# Patient Record
Sex: FEMALE | Race: WHITE | NOT HISPANIC OR LATINO | URBAN - METROPOLITAN AREA
[De-identification: names, ages, dates, MRNs, and addresses within clinical notes are randomized per-mention and may not be internally consistent; named-entity substitution may affect disease eponyms.]

---

## 2018-03-08 ENCOUNTER — INPATIENT (INPATIENT)
Facility: HOSPITAL | Age: 83
LOS: 10 days | Discharge: HOME | DRG: 125 | End: 2018-03-19
Attending: HOSPITALIST | Admitting: HOSPITALIST
Payer: MEDICARE

## 2018-03-08 VITALS
DIASTOLIC BLOOD PRESSURE: 59 MMHG | HEART RATE: 79 BPM | OXYGEN SATURATION: 95 % | RESPIRATION RATE: 18 BRPM | TEMPERATURE: 98 F | SYSTOLIC BLOOD PRESSURE: 113 MMHG

## 2018-03-08 DIAGNOSIS — Z96.653 PRESENCE OF ARTIFICIAL KNEE JOINT, BILATERAL: Chronic | ICD-10-CM

## 2018-03-08 DIAGNOSIS — E78.5 HYPERLIPIDEMIA, UNSPECIFIED: ICD-10-CM

## 2018-03-08 DIAGNOSIS — N30.00 ACUTE CYSTITIS WITHOUT HEMATURIA: ICD-10-CM
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
## 2018-03-08 DIAGNOSIS — E11.9 TYPE 2 DIABETES MELLITUS WITHOUT COMPLICATIONS: ICD-10-CM

## 2018-03-08 DIAGNOSIS — I25.10 ATHEROSCLEROTIC HEART DISEASE OF NATIVE CORONARY ARTERY WITHOUT ANGINA PECTORIS: ICD-10-CM

## 2018-03-08 DIAGNOSIS — S09.90XA UNSPECIFIED INJURY OF HEAD, INITIAL ENCOUNTER: ICD-10-CM

## 2018-03-08 DIAGNOSIS — Z96.649 PRESENCE OF UNSPECIFIED ARTIFICIAL HIP JOINT: Chronic | ICD-10-CM

## 2018-03-08 DIAGNOSIS — Z29.9 ENCOUNTER FOR PROPHYLACTIC MEASURES, UNSPECIFIED: ICD-10-CM

## 2018-03-08 DIAGNOSIS — I10 ESSENTIAL (PRIMARY) HYPERTENSION: ICD-10-CM

## 2018-03-08 DIAGNOSIS — Z79.01 LONG TERM (CURRENT) USE OF ANTICOAGULANTS: ICD-10-CM

## 2018-03-08 DIAGNOSIS — R26.81 UNSTEADINESS ON FEET: ICD-10-CM

## 2018-03-08 DIAGNOSIS — I48.91 UNSPECIFIED ATRIAL FIBRILLATION: ICD-10-CM

## 2018-03-08 DIAGNOSIS — G89.29 OTHER CHRONIC PAIN: ICD-10-CM

## 2018-03-08 LAB
ALBUMIN SERPL ELPH-MCNC: 3.3 G/DL — SIGNIFICANT CHANGE UP (ref 3.3–5.2)
ALP SERPL-CCNC: 46 U/L — SIGNIFICANT CHANGE UP (ref 40–120)
ALT FLD-CCNC: 13 U/L — SIGNIFICANT CHANGE UP
ANION GAP SERPL CALC-SCNC: 13 MMOL/L — SIGNIFICANT CHANGE UP (ref 5–17)
APPEARANCE UR: CLEAR — SIGNIFICANT CHANGE UP
AST SERPL-CCNC: 20 U/L — SIGNIFICANT CHANGE UP
BACTERIA # UR AUTO: ABNORMAL
BASOPHILS # BLD AUTO: 0 K/UL — SIGNIFICANT CHANGE UP (ref 0–0.2)
BILIRUB SERPL-MCNC: 0.6 MG/DL — SIGNIFICANT CHANGE UP (ref 0.4–2)
BILIRUB UR-MCNC: NEGATIVE — SIGNIFICANT CHANGE UP
BLD GP AB SCN SERPL QL: SIGNIFICANT CHANGE UP
BUN SERPL-MCNC: 34 MG/DL — HIGH (ref 8–20)
CALCIUM SERPL-MCNC: 9.1 MG/DL — SIGNIFICANT CHANGE UP (ref 8.6–10.2)
CHLORIDE SERPL-SCNC: 105 MMOL/L — SIGNIFICANT CHANGE UP (ref 98–107)
CO2 SERPL-SCNC: 24 MMOL/L — SIGNIFICANT CHANGE UP (ref 22–29)
COLOR SPEC: YELLOW — SIGNIFICANT CHANGE UP
CREAT SERPL-MCNC: 0.79 MG/DL — SIGNIFICANT CHANGE UP (ref 0.5–1.3)
DIFF PNL FLD: ABNORMAL
EOSINOPHIL # BLD AUTO: 0 K/UL — SIGNIFICANT CHANGE UP (ref 0–0.5)
EOSINOPHIL NFR BLD AUTO: 1 % — SIGNIFICANT CHANGE UP (ref 0–5)
EPI CELLS # UR: SIGNIFICANT CHANGE UP
GLUCOSE BLDC GLUCOMTR-MCNC: 159 MG/DL — HIGH (ref 70–99)
GLUCOSE SERPL-MCNC: 171 MG/DL — HIGH (ref 70–115)
GLUCOSE UR QL: NEGATIVE MG/DL — SIGNIFICANT CHANGE UP
HCT VFR BLD CALC: 36.3 % — LOW (ref 37–47)
HGB BLD-MCNC: 11.5 G/DL — LOW (ref 12–16)
INR BLD: 1.66 RATIO — HIGH (ref 0.88–1.16)
KETONES UR-MCNC: NEGATIVE — SIGNIFICANT CHANGE UP
LEUKOCYTE ESTERASE UR-ACNC: ABNORMAL
LYMPHOCYTES # BLD AUTO: 0.4 K/UL — LOW (ref 1–4.8)
LYMPHOCYTES # BLD AUTO: 7 % — LOW (ref 20–55)
MCHC RBC-ENTMCNC: 27.8 PG — SIGNIFICANT CHANGE UP (ref 27–31)
MCHC RBC-ENTMCNC: 31.7 G/DL — LOW (ref 32–36)
MCV RBC AUTO: 87.9 FL — SIGNIFICANT CHANGE UP (ref 81–99)
MONOCYTES # BLD AUTO: 0.5 K/UL — SIGNIFICANT CHANGE UP (ref 0–0.8)
MONOCYTES NFR BLD AUTO: 8 % — SIGNIFICANT CHANGE UP (ref 3–10)
NEUTROPHILS # BLD AUTO: 4.4 K/UL — SIGNIFICANT CHANGE UP (ref 1.8–8)
NEUTROPHILS NFR BLD AUTO: 81 % — HIGH (ref 37–73)
NEUTS BAND # BLD: 2 % — SIGNIFICANT CHANGE UP (ref 0–8)
NITRITE UR-MCNC: NEGATIVE — SIGNIFICANT CHANGE UP
PH UR: 6 — SIGNIFICANT CHANGE UP (ref 5–8)
PLAT MORPH BLD: ABNORMAL
PLATELET # BLD AUTO: 147 K/UL — LOW (ref 150–400)
POTASSIUM SERPL-MCNC: 4 MMOL/L — SIGNIFICANT CHANGE UP (ref 3.5–5.3)
POTASSIUM SERPL-SCNC: 4 MMOL/L — SIGNIFICANT CHANGE UP (ref 3.5–5.3)
PROT SERPL-MCNC: 6.1 G/DL — LOW (ref 6.6–8.7)
PROT UR-MCNC: 30 MG/DL
PROTHROM AB SERPL-ACNC: 18.4 SEC — HIGH (ref 9.8–12.7)
RBC # BLD: 4.13 M/UL — LOW (ref 4.4–5.2)
RBC # FLD: 15 % — SIGNIFICANT CHANGE UP (ref 11–15.6)
RBC BLD AUTO: NORMAL — SIGNIFICANT CHANGE UP
RBC CASTS # UR COMP ASSIST: SIGNIFICANT CHANGE UP /HPF (ref 0–4)
SODIUM SERPL-SCNC: 142 MMOL/L — SIGNIFICANT CHANGE UP (ref 135–145)
SP GR SPEC: 1.01 — SIGNIFICANT CHANGE UP (ref 1.01–1.02)
UROBILINOGEN FLD QL: 1 MG/DL
VARIANT LYMPHS # BLD: 1 % — SIGNIFICANT CHANGE UP (ref 0–6)
WBC # BLD: 5.3 K/UL — SIGNIFICANT CHANGE UP (ref 4.8–10.8)
WBC # FLD AUTO: 5.3 K/UL — SIGNIFICANT CHANGE UP (ref 4.8–10.8)
WBC UR QL: ABNORMAL

## 2018-03-08 PROCEDURE — 72125 CT NECK SPINE W/O DYE: CPT | Mod: 26

## 2018-03-08 PROCEDURE — 73560 X-RAY EXAM OF KNEE 1 OR 2: CPT | Mod: 26,RT

## 2018-03-08 PROCEDURE — 73502 X-RAY EXAM HIP UNI 2-3 VIEWS: CPT | Mod: 26,RT

## 2018-03-08 PROCEDURE — 93010 ELECTROCARDIOGRAM REPORT: CPT

## 2018-03-08 PROCEDURE — 99222 1ST HOSP IP/OBS MODERATE 55: CPT

## 2018-03-08 PROCEDURE — 71045 X-RAY EXAM CHEST 1 VIEW: CPT | Mod: 26

## 2018-03-08 PROCEDURE — 99285 EMERGENCY DEPT VISIT HI MDM: CPT

## 2018-03-08 PROCEDURE — 70450 CT HEAD/BRAIN W/O DYE: CPT | Mod: 26

## 2018-03-08 RX ORDER — GLUCAGON INJECTION, SOLUTION 0.5 MG/.1ML
1 INJECTION, SOLUTION SUBCUTANEOUS ONCE
Qty: 0 | Refills: 0 | Status: DISCONTINUED | OUTPATIENT
Start: 2018-03-08 | End: 2018-03-19

## 2018-03-08 RX ORDER — FAMOTIDINE 10 MG/ML
20 INJECTION INTRAVENOUS DAILY
Qty: 0 | Refills: 0 | Status: DISCONTINUED | OUTPATIENT
Start: 2018-03-08 | End: 2018-03-19

## 2018-03-08 RX ORDER — LISINOPRIL 2.5 MG/1
10 TABLET ORAL DAILY
Qty: 0 | Refills: 0 | Status: DISCONTINUED | OUTPATIENT
Start: 2018-03-08 | End: 2018-03-13

## 2018-03-08 RX ORDER — FENOFIBRATE,MICRONIZED 130 MG
145 CAPSULE ORAL DAILY
Qty: 0 | Refills: 0 | Status: DISCONTINUED | OUTPATIENT
Start: 2018-03-08 | End: 2018-03-13

## 2018-03-08 RX ORDER — CYCLOBENZAPRINE HYDROCHLORIDE 10 MG/1
10 TABLET, FILM COATED ORAL AT BEDTIME
Qty: 0 | Refills: 0 | Status: DISCONTINUED | OUTPATIENT
Start: 2018-03-08 | End: 2018-03-19

## 2018-03-08 RX ORDER — SACCHAROMYCES BOULARDII 250 MG
250 POWDER IN PACKET (EA) ORAL
Qty: 0 | Refills: 0 | Status: DISCONTINUED | OUTPATIENT
Start: 2018-03-08 | End: 2018-03-19

## 2018-03-08 RX ORDER — OXYBUTYNIN CHLORIDE 5 MG
5 TABLET ORAL
Qty: 0 | Refills: 0 | Status: DISCONTINUED | OUTPATIENT
Start: 2018-03-08 | End: 2018-03-19

## 2018-03-08 RX ORDER — INSULIN LISPRO 100/ML
VIAL (ML) SUBCUTANEOUS
Qty: 0 | Refills: 0 | Status: DISCONTINUED | OUTPATIENT
Start: 2018-03-08 | End: 2018-03-19

## 2018-03-08 RX ORDER — METOPROLOL TARTRATE 50 MG
0.5 TABLET ORAL
Qty: 0 | Refills: 0 | COMMUNITY

## 2018-03-08 RX ORDER — ACETAMINOPHEN 500 MG
975 TABLET ORAL ONCE
Qty: 0 | Refills: 0 | Status: COMPLETED | OUTPATIENT
Start: 2018-03-08 | End: 2018-03-08

## 2018-03-08 RX ORDER — ATORVASTATIN CALCIUM 80 MG/1
80 TABLET, FILM COATED ORAL AT BEDTIME
Qty: 0 | Refills: 0 | Status: DISCONTINUED | OUTPATIENT
Start: 2018-03-08 | End: 2018-03-19

## 2018-03-08 RX ORDER — SODIUM CHLORIDE 9 MG/ML
1000 INJECTION, SOLUTION INTRAVENOUS
Qty: 0 | Refills: 0 | Status: DISCONTINUED | OUTPATIENT
Start: 2018-03-08 | End: 2018-03-19

## 2018-03-08 RX ORDER — DEXTROSE 50 % IN WATER 50 %
1 SYRINGE (ML) INTRAVENOUS ONCE
Qty: 0 | Refills: 0 | Status: DISCONTINUED | OUTPATIENT
Start: 2018-03-08 | End: 2018-03-19

## 2018-03-08 RX ORDER — FONDAPARINUX SODIUM 2.5 MG/.5ML
1 INJECTION, SOLUTION SUBCUTANEOUS
Qty: 0 | Refills: 0 | COMMUNITY

## 2018-03-08 RX ORDER — INSULIN LISPRO 100/ML
VIAL (ML) SUBCUTANEOUS AT BEDTIME
Qty: 0 | Refills: 0 | Status: DISCONTINUED | OUTPATIENT
Start: 2018-03-08 | End: 2018-03-19

## 2018-03-08 RX ORDER — POTASSIUM CHLORIDE 20 MEQ
20 PACKET (EA) ORAL DAILY
Qty: 0 | Refills: 0 | Status: DISCONTINUED | OUTPATIENT
Start: 2018-03-08 | End: 2018-03-13

## 2018-03-08 RX ORDER — ROSUVASTATIN CALCIUM 5 MG/1
1 TABLET ORAL
Qty: 0 | Refills: 0 | COMMUNITY

## 2018-03-08 RX ORDER — MORPHINE SULFATE 50 MG/1
15 CAPSULE, EXTENDED RELEASE ORAL EVERY 12 HOURS
Qty: 0 | Refills: 0 | Status: DISCONTINUED | OUTPATIENT
Start: 2018-03-08 | End: 2018-03-15

## 2018-03-08 RX ORDER — ASPIRIN/CALCIUM CARB/MAGNESIUM 324 MG
81 TABLET ORAL DAILY
Qty: 0 | Refills: 0 | Status: DISCONTINUED | OUTPATIENT
Start: 2018-03-08 | End: 2018-03-19

## 2018-03-08 RX ORDER — BUMETANIDE 0.25 MG/ML
0.5 INJECTION INTRAMUSCULAR; INTRAVENOUS EVERY OTHER DAY
Qty: 0 | Refills: 0 | Status: DISCONTINUED | OUTPATIENT
Start: 2018-03-08 | End: 2018-03-13

## 2018-03-08 RX ORDER — DEXTROSE 50 % IN WATER 50 %
25 SYRINGE (ML) INTRAVENOUS ONCE
Qty: 0 | Refills: 0 | Status: DISCONTINUED | OUTPATIENT
Start: 2018-03-08 | End: 2018-03-19

## 2018-03-08 RX ORDER — DEXTROSE 50 % IN WATER 50 %
12.5 SYRINGE (ML) INTRAVENOUS ONCE
Qty: 0 | Refills: 0 | Status: DISCONTINUED | OUTPATIENT
Start: 2018-03-08 | End: 2018-03-19

## 2018-03-08 RX ORDER — METOPROLOL TARTRATE 50 MG
12.5 TABLET ORAL
Qty: 0 | Refills: 0 | Status: DISCONTINUED | OUTPATIENT
Start: 2018-03-08 | End: 2018-03-19

## 2018-03-08 RX ORDER — CEFTRIAXONE 500 MG/1
1 INJECTION, POWDER, FOR SOLUTION INTRAMUSCULAR; INTRAVENOUS EVERY 24 HOURS
Qty: 0 | Refills: 0 | Status: DISCONTINUED | OUTPATIENT
Start: 2018-03-08 | End: 2018-03-12

## 2018-03-08 RX ORDER — OXYBUTYNIN CHLORIDE 5 MG
1 TABLET ORAL
Qty: 0 | Refills: 0 | COMMUNITY

## 2018-03-08 RX ORDER — RIVAROXABAN 15 MG-20MG
15 KIT ORAL EVERY 24 HOURS
Qty: 0 | Refills: 0 | Status: DISCONTINUED | OUTPATIENT
Start: 2018-03-08 | End: 2018-03-19

## 2018-03-08 RX ADMIN — Medication 1: at 18:55

## 2018-03-08 RX ADMIN — Medication 12.5 MILLIGRAM(S): at 18:58

## 2018-03-08 RX ADMIN — Medication 5 MILLIGRAM(S): at 18:58

## 2018-03-08 RX ADMIN — Medication 975 MILLIGRAM(S): at 11:03

## 2018-03-08 NOTE — ED ADULT TRIAGE NOTE - CHIEF COMPLAINT QUOTE
Pt BIBA A&Ox3 c/o trip and fall, unwitnessed. Pt states " my legs gave out." Pt with + facial trauma, on xarelto. Denies LOC. Code trauma B activated.

## 2018-03-08 NOTE — ED PROVIDER NOTE - CARE PLAN
Principal Discharge DX:	Closed head injury, initial encounter  Secondary Diagnosis:	Unsteady gait  Secondary Diagnosis:	Fall from standing, initial encounter

## 2018-03-08 NOTE — ED PROVIDER NOTE - OBJECTIVE STATEMENT
This is an 82F w/Hx of HTN, Afib on rivaroxaban, obesity, hx of bilateral knee replacements who is BIBEMS for fall with head trauma. Pt was in her USOH at home when she got out of bed this morning and felt a sudden pain in her R-knee causing her leg to give out and her to fall forward striking her head. She denies neck pain or LOC, visual changes, fevers or chills. She has some pain over her R-cheekbone and R-mouth. She denies loose teeth. She denies back pain beyond her chronic R-flank pain, sensory changes or weakness. Pt was unable to stand up afterwards. Urinated on herself while waiting for help.

## 2018-03-08 NOTE — PROVIDER CONTACT NOTE (OTHER) - ASSESSMENT
Pt with no c/o pain before, during or after RX.  Pt will benefit from PT to maximize functional independence.  Will continue to follow.  Pt left Supine in bed in chair in no apparent distress and call bell within reach. Nurse aware.

## 2018-03-08 NOTE — ED ADULT NURSE REASSESSMENT NOTE - NS ED NURSE REASSESS COMMENT FT1
Patient resting comfortably in bed, VSS, ate lunch, waiting for bed status, will continue to monitor

## 2018-03-08 NOTE — PHYSICAL THERAPY INITIAL EVALUATION ADULT - ADDITIONAL COMMENTS
Pt lives alone in a 1 story house with 2 steps to enter.  PTA, amb with RW and Modified Independent with all ADLs and self care.

## 2018-03-08 NOTE — ED PROVIDER NOTE - PHYSICAL EXAMINATION
1' survey: airway intact, bilateral breath sounds, BP appropriate. HR appropriate, 2+ distal pulses in all extremities, exposed, GCS 15, pupils 3mm equal round and reactive  2' survey  GEN: uncomfortable, nontoxic, AOx4  HEAD: no scalp or palpable skull fx, +facial trauma  EYES: 3mm, pupils equal round and reactive to light, extra-occular movements are intact, full visual fields, swelling and tenderness over R-zygoma with echymosis  ENT: mucus membranes are moist, oropharynx is within normal limits, no malocclusion, no loose teeth, dried blood in tongue and on mouth and 1cm superficial laceration at commissure of R lips on mucosal surface (nonsuturable), no other visualized intraoral trauma. nose nontender, no epistaxis or dried blood, no septal hemtaoma, no discharge or bleeding from ears  RESP: lungs clear to auscultation bilaterally, equal breath sounds bilaterally, chest wall nontender anteriorly  GI: abd soft, nontender, nondistended, obese, mild R-flank tenderness  MSK: pelvis stable, tender to lateral compression over illiac wings and trochanters on the R-side, no C, T or L spine tenderness, deformity or stepoff, lateral R-back/flank tenderness mild without overlying visualized trauma (chronic per patient and at baseline), no upper extremity abnormalities, LLE is unontender iwthout deformity, scar c/w L-knee replacement, RLE notable for hip and knee arthroplasty scars, pain on ranging R-knee but full ROM and no palpable deformity, diffuse mild tenderness over joint, extensor intact, no byron tenderness over hip joint but TTP over greater trochanter, no other femur tenderness. neurovascularly intact below knee, 2+

## 2018-03-08 NOTE — CONSULT NOTE ADULT - SUBJECTIVE AND OBJECTIVE BOX
PMD - [facility]    82 year old female with PMH HTN, DMT2, Dyslipidemia, CAD s/p PCI, Chronic pain presents to [facility] after falling at home.   As per patient she got up out of bed this morning and her legs just gave way and she fell forward. She hasn't been feeling that good lately and vomited x 4 5 days ago, NBNB.  She denies any dizziness, paresthesiae, paresis, chest pain, palpitations, dyspnea, cough, nausea, abdominal pain, dysuria or diarrhea.  She uses a walker to get around.    PMH as above  PSH Bilateral TKA  Allergy - NKA  FH - reviewed and is non contributory.  SocHx Lives alone. No smoking, EtOH or drug use.  ROS- negative except as mentioned above.    O/E   Vital Signs Last 24 Hrs  T(C): 36.9 (08 Mar 2018 15:35), Max: 36.9 (08 Mar 2018 15:35)  T(F): 98.5 (08 Mar 2018 15:35), Max: 98.5 (08 Mar 2018 15:35)  HR: 80 (08 Mar 2018 18:59) (79 - 80)  BP: 130/58 (08 Mar 2018 18:59) (113/59 - 130/58)   RR: 18 (08 Mar 2018 15:35) (18 - 18)  SpO2: 95% (08 Mar 2018 15:35) (95% - 95%)      GEN: Morbidly obese  female appearing stated age, smelling of urine, NAD  HEENT: Right periorbital ecchymoses, [facility]    82 year old female with PMH HTN, DMT2, Dyslipidemia, CAD s/p PCI, Chronic pain presents to [facility] after falling at home.   As per patient she got up out of bed this morning and her legs just gave way and she fell forward. She hasn't been feeling that good lately and vomited x 4 5 days ago, NBNB.  She denies any dizziness, paresthesiae, paresis, chest pain, palpitations, dyspnea, cough, nausea, abdominal pain, dysuria or diarrhea.  She uses a walker to get around.    PMH as above  PSH Bilateral TKA  Allergy - NKA  FH - reviewed and is non contributory.  SocHx Lives alone. No smoking, EtOH or drug use.  ROS- negative except as mentioned above.    O/E   Vital Signs Last 24 Hrs  T(C): 36.9 (08 Mar 2018 15:35), Max: 36.9 (08 Mar 2018 15:35)  T(F): 98.5 (08 Mar 2018 15:35), Max: 98.5 (08 Mar 2018 15:35)  HR: 80 (08 Mar 2018 18:59) (79 - 80)  BP: 130/58 (08 Mar 2018 18:59) (113/59 - 130/58)   RR: 18 (08 Mar 2018 15:35) (18 - 18)  SpO2: 95% (08 Mar 2018 15:35) (95% - 95%)      GEN: Morbidly obese  female appearing stated age, smelling of urine, NAD  HEENT: Right periorbital ecchymoses, no nasal or ear discharge  Neck: Supple, thick  CVS: regular rate and rhythm S1, S2  Resp: CTAB  Breast: symmetric  GI: Soft nondistended nontender BS+  : No suprapubic tenderness  MSK: FROM, no edema  Integ: Skin warm and dry  CNS- AAOx3, grossly intact  Psych- Fair mood, affect    CBC Full  -  ( 08 Mar 2018 09:09 )  WBC Count : 5.3 K/uL  Hemoglobin : 11.5 g/dL  Hematocrit : 36.3 %  Platelet Count - Automated : 147 K/uL  Mean Cell Volume : 87.9 fl  Mean Cell Hemoglobin : 27.8 pg  Mean Cell Hemoglobin Concentration : 31.7 g/dL  Auto Neutrophil # : 4.4 K/uL  Auto Lymphocyte # : 0.4 K/uL  Auto Monocyte # : 0.5 K/uL  Auto Eosinophil # : 0.0 K/uL  Auto Basophil # : 0.0 K/uL  Auto Neutrophil % : 81.0 %  Auto Lymphocyte % : 7.0 %  Auto Monocyte % : 8.0 %  Auto Eosinophil % : 1.0 %  Auto Basophil % : x      PT/INR - ( 08 Mar 2018 10:08 )   PT: 18.4 sec;   INR: 1.66 ratio                 142  |  105  |  34.0<H>  ----------------------------<  171<H>  4.0   |  24.0  |  0.79    Ca    9.1      08 Mar 2018 09:09    TPro  6.1<L>  /  Alb  3.3  /  TBili  0.6  /  DBili  x   /  AST  20  /  ALT  13  /  AlkPhos  46          Urinalysis Basic - ( 08 Mar 2018 17:28 )    Color: Yellow / Appearance: Clear / S.010 / pH: x  Gluc: x / Ketone: Negative  / Bili: Negative / Urobili: 1 mg/dL   Blood: x / Protein: 30 mg/dL / Nitrite: Negative   Leuk Esterase: Small / RBC: 0-2 /HPF / WBC 11-25   Sq Epi: x / Non Sq Epi: Few / Bacteria: Few    < from: CT Head No Cont (18 @ 09:30) >   EXAM:  CT BRAIN                          PROCEDURE DATE:  2018          INTERPRETATION:      CLINICAL INFORMATION: 82-year-old trauma    TECHNIQUE: Contiguous non contrast axial images of brain from skull base   to vertex sagittal and coronal reformations.   This scan was performed   using automatic exposure control (radiation dose reduction software) to   obtain a diagnostic image quality scan with patient dose as low as   reasonably achievable.      COMPARISON: No previous examinationsare available for review.     FINDINGS:       There is prominence of the ventricles and cortical sulci. Midline   structures are intact. There are patchy hypodensities in periventricular   distribution consistent with chronic small vessel ischemic changes. There   is no CT evidence of acute territorial infarction.  There is no   hemorrhage, contusion or extraaxial collection. There is no acute   hydrocephalus. The visualized posterior fossa structures are intact.   There are scattered intracranial arterial calcification.  The visualized   paranasal sinuses are clear. There is right-sided periorbital   swelling/hematoma    IMPRESSION:       No parenchymal contusion, hemorrhage or extra-axial collection.    No CT evidence of an acute territorial infarction.    Chronic small vessel ischemic changes.    Right periorbital/soft tissue hematoma.                XU ESPINOZA M.D., ATTENDING RADIOLOGIST  This document has been electronically signed. Mar  8 2018  9:59AM    < end of copied text >      < from: Xray Chest 1 View AP/PA. (18 @ 09:13) >   EXAM:  XR CHEST AP OR PA 1V                          PROCEDURE DATE:  2018          INTERPRETATION:  Exam Date: 3/8/2018 9:13 AM    History: Trauma    Technique: Single frontal portable view of the chest with comparison to    2006    Findings:    Heart is enlarged however may be secondary to AP projection. Studies   limited by poor aspiration. Slight increased markings at the lung bases   likely secondary to poor inspiration. No focal consolidation.. The apices   and hemidiaphragms are unremarkable. Degenerative changes of the   visualized osseous structures.        Impression:    No acute disease       MARTINE LARSON M.D., ATTENDING RADIOLOGIST  This document has been electronically signed. Mar  8 2018  9:34AM    < end of copied text >      < from: CT Cervical Spine No Cont (18 @ 09:30) >   EXAM:  CT CERVICAL SPINE                          PROCEDURE DATE:  2018          INTERPRETATION:      REASON FOR EXAM:  Multitrauma     TECHNIQUE:   High resolution transaxial imaging with sagittal and coronal   reformations.    COMPARISON: None.    FINDINGS:  There is straightening of the normal cervical lordosis.  There is   osteopenia and multilevel degenerative spondylosis.     There are degenerative changes of the atlantoaxial joint with productive   changes. There anterior and posterior arches of C1 and the odontoid are   grossly intact without displaced fracture, subchondral cyst base of   odontoid. There is dorsal pannus formation deforming ventral foramen   magnum.    C2-3: There is slight decrease in disc height. There is mild bulge and   minimal endplate spondylosis.  There is bilateral uncovertebral joint   arthrosis. There is mild canal and stenosis.    C3-4: There is decrease in disc height and endplate spondylosis. There is   central disc osteophyte protrusion and bone spur deforming and flattening   the cord There is bilateral uncovertebral joint arthrosis. There is   severe central canal and foraminal narrowing.    C4-5: There is decrease in disc height. There is left paramedian disc   osteophyte complex, spur and productive changes. There are degenerative   changes of the uncovertebral and apophyseal joints. There is severe canal   canal and foraminal stenosis.    C5-6: There is decrease in disc height. There is central to left   paramedian disc osteophyte complex and endplate spur. There are   degenerative changes of the uncovertebral and apophyseal joints. There is   severe canal and bilateral foraminal stenosis.    C6-7: There is decrease in disc height and endplate spondylosis. There is   broad-based foraminal disc osteophyte complex and uncovertebral joint   arthrosis. There is moderate canal and moderate bilateral foraminal   stenosis.    C7-T1: There is slight decrease in disc height and endplate spondylosis.   There is right paramedian disc osteophyte complex and endplate spur.   There are degenerative changes of the apophyseal joints. There is   moderate canal and foraminal narrowing.    The paraspinal soft tissues demonstrates scattered arterial   calcification. There is mild heterogeneous thyroid gland. There are   chronic changes in the left thyroid gland. Marked destructive changes   right humeral head with associated soft tissue abnormality in the joint   space right lateral chest wall which is partially imaged    The visualized lung apices are clear.    IMPRESSION:    Advanced multilevel degenerative cervical spondylosis without acute   fracture; multilevel severe canal and foraminal narrowing as described   above.    If additional assessment is needed correlate with MRI.      XU ESPINOZA M.D., ATTENDING RADIOLOGIST  This document has been electronically signed. Mar  8 2018 10:07AM    < end of copied text >

## 2018-03-08 NOTE — ED PROVIDER NOTE - PROGRESS NOTE DETAILS
cleared by trauma surgery if she can be observed 6-12h from injury seen by PT cannot ambulate, recommending subacute rehab. per d/w trauma will be admitted to medicine as no significant traumatic injuries.

## 2018-03-08 NOTE — ED PROVIDER NOTE - PMH
Atrial fibrillation, unspecified type    CAD (coronary artery disease)    Chronic anticoagulation    Chronic pain

## 2018-03-08 NOTE — ED PROVIDER NOTE - MEDICAL DECISION MAKING DETAILS
s/p fall on AC, +head trauma. CT head (sufficient to visualize facial bone per trauma), C-spine, CXR, pelvis and RLE XR, reeval. Tetanus UTD per patient. Declining pain Rx. No need for torso CT based on exam and mechanism at this time, but will reeval.

## 2018-03-08 NOTE — H&P ADULT - ASSESSMENT
Patient is an 81 yo F code Trauma B s/p fall from standing height on Xarelto who hit her head with no LOC  - f/u CT head and C-spine  - f/u R knee x-ray  - f/u labs  - tertiary survey  - pain control

## 2018-03-08 NOTE — H&P ADULT - NSHPPHYSICALEXAM_GEN_ALL_CORE
PRIMARY:  A: airway intact, speaking full sentences  B: equal bilateral breath sounds, CTA  C: +2 central pulses, no external hemorrhage  D: GCS 15, pupils 3mm, no deformities, no stepoffs  E: full exposure obtained      SECONDARY:  Appearance: NAD	  HEENT:   GCS 15, 3mm, PERRL, EOMI, Right periorbital ecchymosis, dried blood in oropharynx  Chest: S1, S2, No JVD, no tracheal deviation, b/l breath sounds, CTA  Abdomen:  Soft, Non-tender, Non-distended  Skin: No abrasions, lacerations or contusions  Neurologic:  A&Ox3, no sensory or motor deficits  Musculoskeletal: Normal range of motion, strength 5/5 in all extremities, no bony stepoffs  Vascular: Peripheral pulses palpable 2+ bilaterally

## 2018-03-08 NOTE — H&P ADULT - HISTORY OF PRESENT ILLNESS
Patient is a 83 yo F who was BIBEMS code Trauma B s/p fall from bed. Hx of knee repair, Patient is a 83 yo F who was BIBEMS code Trauma B s/p fall from standing on Xarelto. Patient has a hx of R knee repair and reports her knee gave out when she was getting out of bed. She states she hit her right eye on a chair while she was falling. Pt remembers everything, denies LOC. On arrival to trauma bay, patient AAOx3, in NAD, with a GCS of 15. Has periorbital ecchymosis and some dried blood in her oropharynx. Has some pain in her R knee but denies any other complaints.    PRIMARY SURVEY:  A: airway patent, speaking in full sentences  B: bilateral breath sounds Patient is a 83 yo F who was BIBEMS code Trauma B s/p fall from standing on Xarelto. Patient has a hx of R knee repair and reports her knee gave out when she was getting out of bed. She states she hit her right eye on a chair while she was falling. Pt remembers everything, denies LOC. On arrival to trauma bay, patient AAOx3, in NAD, with a GCS of 15. Has periorbital ecchymosis and some dried blood in her oropharynx. Has some pain in her R knee but denies any other complaints. CXR negative. Sent for CT of head and c-spine.    PRIMARY SURVEY:  A: airway patent, speaking in full sentences  B: bilateral breath sounds CTA  C: +2 central pulses, no signs of external hemorrhage  D: GCS 15, Pupils 3mm ERRLA  E: full exposure obtained    PRIMARY:  A: airway intact, speaking full sentences  B: equal bilateral breath sounds, CTA  C: +2 central pulses, no external hemorrhage  D: GCS 15, pupils 3mm, no deformities, no stepoffs  E: full exposure obtained      SECONDARY:  Appearance: NAD	  HEENT:   GCS 15, 3mm, PERRL, EOMI, Right periorbital ecchymosis, dried blood in oropharynx  Chest: S1, S2, No JVD, no tracheal deviation, b/l breath sounds, CTA  Abdomen:  Soft, Non-tender, Non-distended  Skin: No abrasions, lacerations or contusions  Neurologic:  A&Ox3, no sensory or motor deficits  Musculoskeletal: Normal range of motion, strength 5/5 in all extremities, no bony stepoffs  Vascular: Peripheral pulses palpable 2+ bilaterally

## 2018-03-08 NOTE — H&P ADULT - ATTENDING COMMENTS
Patient on xarelto had mechanical ffs secondary to "knee gave out" post bilateral TKA and she has had flu like symptoms the past 3-4 days. primary survey ok, secondary has swelling ecchymosis around right eye no visual impairment. Limited rom bilat knees produces unsteady gait. Given anticoagulation status on NOAC she had HCT that is NORMAL. Discussed possibility of delayed ICH post fall with index CT normal. Plan is 6 h obs here in ED then if can pass ambulation challenge dc in care of family.

## 2018-03-08 NOTE — PHYSICAL THERAPY INITIAL EVALUATION ADULT - IMPAIRMENTS FOUND, PT EVAL
aerobic capacity/endurance/muscle strength/anthropometric characteristics/gait, locomotion, and balance/gross motor

## 2018-03-08 NOTE — CONSULT NOTE ADULT - PROBLEM SELECTOR RECOMMENDATION 9
Admit to any bed as patient needs to stay for 3 days for LESLIE  Rocephin  UCx  Continue Oxybutynin VTE ppx - Xarelto  Cdiff ppx - FLorastor

## 2018-03-09 LAB
ANION GAP SERPL CALC-SCNC: 12 MMOL/L — SIGNIFICANT CHANGE UP (ref 5–17)
BUN SERPL-MCNC: 24 MG/DL — HIGH (ref 8–20)
CALCIUM SERPL-MCNC: 8.6 MG/DL — SIGNIFICANT CHANGE UP (ref 8.6–10.2)
CHLORIDE SERPL-SCNC: 106 MMOL/L — SIGNIFICANT CHANGE UP (ref 98–107)
CO2 SERPL-SCNC: 25 MMOL/L — SIGNIFICANT CHANGE UP (ref 22–29)
CREAT SERPL-MCNC: 0.77 MG/DL — SIGNIFICANT CHANGE UP (ref 0.5–1.3)
GLUCOSE BLDC GLUCOMTR-MCNC: 118 MG/DL — HIGH (ref 70–99)
GLUCOSE BLDC GLUCOMTR-MCNC: 140 MG/DL — HIGH (ref 70–99)
GLUCOSE BLDC GLUCOMTR-MCNC: 151 MG/DL — HIGH (ref 70–99)
GLUCOSE BLDC GLUCOMTR-MCNC: 194 MG/DL — HIGH (ref 70–99)
GLUCOSE BLDC GLUCOMTR-MCNC: 200 MG/DL — HIGH (ref 70–99)
GLUCOSE SERPL-MCNC: 165 MG/DL — HIGH (ref 70–115)
HBA1C BLD-MCNC: 7 % — HIGH (ref 4–5.6)
HCT VFR BLD CALC: 33.7 % — LOW (ref 37–47)
HGB BLD-MCNC: 10.7 G/DL — LOW (ref 12–16)
MCHC RBC-ENTMCNC: 27.8 PG — SIGNIFICANT CHANGE UP (ref 27–31)
MCHC RBC-ENTMCNC: 31.8 G/DL — LOW (ref 32–36)
MCV RBC AUTO: 87.5 FL — SIGNIFICANT CHANGE UP (ref 81–99)
PLATELET # BLD AUTO: 167 K/UL — SIGNIFICANT CHANGE UP (ref 150–400)
POTASSIUM SERPL-MCNC: 3.8 MMOL/L — SIGNIFICANT CHANGE UP (ref 3.5–5.3)
POTASSIUM SERPL-SCNC: 3.8 MMOL/L — SIGNIFICANT CHANGE UP (ref 3.5–5.3)
RBC # BLD: 3.85 M/UL — LOW (ref 4.4–5.2)
RBC # FLD: 15.1 % — SIGNIFICANT CHANGE UP (ref 11–15.6)
SODIUM SERPL-SCNC: 143 MMOL/L — SIGNIFICANT CHANGE UP (ref 135–145)
WBC # BLD: 5.8 K/UL — SIGNIFICANT CHANGE UP (ref 4.8–10.8)
WBC # FLD AUTO: 5.8 K/UL — SIGNIFICANT CHANGE UP (ref 4.8–10.8)

## 2018-03-09 PROCEDURE — 99232 SBSQ HOSP IP/OBS MODERATE 35: CPT

## 2018-03-09 RX ADMIN — MORPHINE SULFATE 15 MILLIGRAM(S): 50 CAPSULE, EXTENDED RELEASE ORAL at 22:18

## 2018-03-09 RX ADMIN — MORPHINE SULFATE 15 MILLIGRAM(S): 50 CAPSULE, EXTENDED RELEASE ORAL at 23:18

## 2018-03-09 RX ADMIN — LISINOPRIL 10 MILLIGRAM(S): 2.5 TABLET ORAL at 05:07

## 2018-03-09 RX ADMIN — MORPHINE SULFATE 15 MILLIGRAM(S): 50 CAPSULE, EXTENDED RELEASE ORAL at 00:59

## 2018-03-09 RX ADMIN — RIVAROXABAN 15 MILLIGRAM(S): KIT at 01:00

## 2018-03-09 RX ADMIN — Medication 1: at 11:30

## 2018-03-09 RX ADMIN — ATORVASTATIN CALCIUM 80 MILLIGRAM(S): 80 TABLET, FILM COATED ORAL at 00:16

## 2018-03-09 RX ADMIN — Medication 5 MILLIGRAM(S): at 05:07

## 2018-03-09 RX ADMIN — Medication 12.5 MILLIGRAM(S): at 05:07

## 2018-03-09 RX ADMIN — RIVAROXABAN 15 MILLIGRAM(S): KIT at 17:30

## 2018-03-09 RX ADMIN — BUMETANIDE 0.5 MILLIGRAM(S): 0.25 INJECTION INTRAMUSCULAR; INTRAVENOUS at 11:31

## 2018-03-09 RX ADMIN — ATORVASTATIN CALCIUM 80 MILLIGRAM(S): 80 TABLET, FILM COATED ORAL at 22:18

## 2018-03-09 RX ADMIN — Medication 5 MILLIGRAM(S): at 17:31

## 2018-03-09 RX ADMIN — Medication 81 MILLIGRAM(S): at 11:30

## 2018-03-09 RX ADMIN — FAMOTIDINE 20 MILLIGRAM(S): 10 INJECTION INTRAVENOUS at 11:31

## 2018-03-09 RX ADMIN — Medication 250 MILLIGRAM(S): at 17:31

## 2018-03-09 RX ADMIN — Medication 20 MILLIEQUIVALENT(S): at 11:31

## 2018-03-09 RX ADMIN — Medication 12.5 MILLIGRAM(S): at 17:30

## 2018-03-09 RX ADMIN — Medication 145 MILLIGRAM(S): at 11:33

## 2018-03-09 RX ADMIN — CEFTRIAXONE 100 GRAM(S): 500 INJECTION, POWDER, FOR SOLUTION INTRAMUSCULAR; INTRAVENOUS at 05:07

## 2018-03-09 RX ADMIN — Medication 250 MILLIGRAM(S): at 05:07

## 2018-03-09 NOTE — PROGRESS NOTE ADULT - PROBLEM SELECTOR PLAN 4
Carb consistent diet  Continue Accuchecks  Sliding scale Insulins  A1c - 7  Resume home OHA upon discharge

## 2018-03-09 NOTE — PROGRESS NOTE ADULT - ASSESSMENT
82 year old female with PMH HTN, DMT2, Dyslipidemia, CAD s/p PCI, Chronic pain presents to [facility] after falling at home, after her legs gave way upon arising from bed.  Examination significant for Right periorbital ecchymosis. Afebilre, VSS.  UA significant for infection, otherwise investigations unremarkable except for right periorbital hematoma on CTH.  Mild dehydration on renal indices

## 2018-03-09 NOTE — PROGRESS NOTE ADULT - SUBJECTIVE AND OBJECTIVE BOX
FLEX GAGET  [id number]  82yFemale    Patient is a 82y old  Female who presents with a chief complaint of Trauma B s/p fall from bed on Xarelto (08 Mar 2018 09:00)      SUBJECTIVE:   Chart reviewed since last visit.  Patient seen and examined at bedside fot UTI, fall  Denies any dizziness, fever, chills, dysuria, abdominal pain nausea or vomiting      OBJECTIVE:  Vital Signs Last 24 Hrs  T(C): 36.6 (09 Mar 2018 03:06), Max: 37.1 (09 Mar 2018 01:01)  T(F): 97.9 (09 Mar 2018 03:06), Max: 98.8 (09 Mar 2018 01:01)  HR: 74 (09 Mar 2018 05:03) (74 - 92)  BP: 126/61 (09 Mar 2018 05:03) (113/59 - 132/60)   RR: 18 (09 Mar 2018 05:03) (18 - 20)  SpO2: 94% (09 Mar 2018 05:03) (94% - 96%)    GEN: Morbidly obese  female appearing stated age,  NAD  HEENT: Right periorbital and facial ecchymoses, no nasal or ear discharge  Neck: Supple, thick  CVS: regular rate and rhythm S1, S2  Resp: CTAB  GI: Soft nondistended nontender BS+  : No suprapubic tenderness  MSK: FROM, no edema  Integ: Skin warm and dry  CNS- AAOx3, grossly intact  Psych- Fair mood, affect     CAPILLARY BLOOD GLUCOSE      POCT Blood Glucose.: 194 mg/dL (09 Mar 2018 11:28)  POCT Blood Glucose.: 140 mg/dL (09 Mar 2018 07:45)  POCT Blood Glucose.: 151 mg/dL (09 Mar 2018 00:20)  POCT Blood Glucose.: 159 mg/dL (08 Mar 2018 18:55)    Hemoglobin A1C, Whole Blood (18 @ 06:23)    Hemoglobin A1C, Whole Blood: 7.0 %        I&O's Summary                          10.7   5.8   )-----------( 167      ( 09 Mar 2018 06:23 )             33.7     PT/INR - ( 08 Mar 2018 10:08 )   PT: 18.4 sec;   INR: 1.66 ratio               143  |  106  |  24.0<H>  ----------------------------<  165<H>  3.8   |  25.0  |  0.77    Ca    8.6      09 Mar 2018 06:23    TPro  6.1<L>  /  Alb  3.3  /  TBili  0.6  /  DBili  x   /  AST  20  /  ALT  13  /  AlkPhos  46  03-08        Urinalysis Basic - ( 08 Mar 2018 17:28 )    Color: Yellow / Appearance: Clear / S.010 / pH: x  Gluc: x / Ketone: Negative  / Bili: Negative / Urobili: 1 mg/dL   Blood: x / Protein: 30 mg/dL / Nitrite: Negative   Leuk Esterase: Small / RBC: 0-2 /HPF / WBC 11-25   Sq Epi: x / Non Sq Epi: Few / Bacteria: Few        Culture: Pending    MEDICATIONS  (STANDING):  aspirin enteric coated 81 milliGRAM(s) Oral daily  atorvastatin 80 milliGRAM(s) Oral at bedtime  buMETAnide 0.5 milliGRAM(s) Oral every other day  cefTRIAXone   IVPB 1 Gram(s) IV Intermittent every 24 hours  dextrose 5%. 1000 milliLiter(s) (50 mL/Hr) IV Continuous <Continuous>  dextrose 50% Injectable 12.5 Gram(s) IV Push once  dextrose 50% Injectable 25 Gram(s) IV Push once  dextrose 50% Injectable 25 Gram(s) IV Push once  famotidine    Tablet 20 milliGRAM(s) Oral daily  fenofibrate Tablet 145 milliGRAM(s) Oral daily  insulin lispro (HumaLOG) corrective regimen sliding scale   SubCutaneous three times a day before meals  insulin lispro (HumaLOG) corrective regimen sliding scale   SubCutaneous at bedtime  lisinopril 10 milliGRAM(s) Oral daily  metoprolol     tartrate 12.5 milliGRAM(s) Oral two times a day  morphine ER Tablet 15 milliGRAM(s) Oral every 12 hours  oxybutynin 5 milliGRAM(s) Oral two times a day  potassium chloride   Powder 20 milliEquivalent(s) Oral daily  rivaroxaban 15 milliGRAM(s) Oral every 24 hours  saccharomyces boulardii 250 milliGRAM(s) Oral two times a day      MEDICATIONS  (PRN):  cyclobenzaprine 10 milliGRAM(s) Oral at bedtime PRN Muscle Spasm  dextrose Gel 1 Dose(s) Oral once PRN Blood Glucose LESS THAN 70 milliGRAM(s)/deciliter  glucagon  Injectable 1 milliGRAM(s) IntraMuscular once PRN Glucose LESS THAN 70 milligrams/deciliter

## 2018-03-10 LAB
ANION GAP SERPL CALC-SCNC: 14 MMOL/L — SIGNIFICANT CHANGE UP (ref 5–17)
BUN SERPL-MCNC: 24 MG/DL — HIGH (ref 8–20)
CALCIUM SERPL-MCNC: 8.8 MG/DL — SIGNIFICANT CHANGE UP (ref 8.6–10.2)
CHLORIDE SERPL-SCNC: 104 MMOL/L — SIGNIFICANT CHANGE UP (ref 98–107)
CO2 SERPL-SCNC: 26 MMOL/L — SIGNIFICANT CHANGE UP (ref 22–29)
CREAT SERPL-MCNC: 0.81 MG/DL — SIGNIFICANT CHANGE UP (ref 0.5–1.3)
ESTIMATED AVERAGE GLUCOSE: 166 MG/DL — HIGH (ref 68–114)
GLUCOSE BLDC GLUCOMTR-MCNC: 126 MG/DL — HIGH (ref 70–99)
GLUCOSE BLDC GLUCOMTR-MCNC: 148 MG/DL — HIGH (ref 70–99)
GLUCOSE BLDC GLUCOMTR-MCNC: 192 MG/DL — HIGH (ref 70–99)
GLUCOSE BLDC GLUCOMTR-MCNC: 201 MG/DL — HIGH (ref 70–99)
GLUCOSE SERPL-MCNC: 153 MG/DL — HIGH (ref 70–115)
HBA1C BLD-MCNC: 7.4 % — HIGH (ref 4–5.6)
POTASSIUM SERPL-MCNC: 3.8 MMOL/L — SIGNIFICANT CHANGE UP (ref 3.5–5.3)
POTASSIUM SERPL-SCNC: 3.8 MMOL/L — SIGNIFICANT CHANGE UP (ref 3.5–5.3)
SODIUM SERPL-SCNC: 144 MMOL/L — SIGNIFICANT CHANGE UP (ref 135–145)

## 2018-03-10 PROCEDURE — 99233 SBSQ HOSP IP/OBS HIGH 50: CPT

## 2018-03-10 RX ADMIN — Medication 12.5 MILLIGRAM(S): at 05:18

## 2018-03-10 RX ADMIN — MORPHINE SULFATE 15 MILLIGRAM(S): 50 CAPSULE, EXTENDED RELEASE ORAL at 10:54

## 2018-03-10 RX ADMIN — MORPHINE SULFATE 15 MILLIGRAM(S): 50 CAPSULE, EXTENDED RELEASE ORAL at 11:24

## 2018-03-10 RX ADMIN — FAMOTIDINE 20 MILLIGRAM(S): 10 INJECTION INTRAVENOUS at 12:38

## 2018-03-10 RX ADMIN — Medication 1: at 12:35

## 2018-03-10 RX ADMIN — ATORVASTATIN CALCIUM 80 MILLIGRAM(S): 80 TABLET, FILM COATED ORAL at 21:30

## 2018-03-10 RX ADMIN — CEFTRIAXONE 100 GRAM(S): 500 INJECTION, POWDER, FOR SOLUTION INTRAMUSCULAR; INTRAVENOUS at 05:18

## 2018-03-10 RX ADMIN — Medication 12.5 MILLIGRAM(S): at 18:58

## 2018-03-10 RX ADMIN — Medication 5 MILLIGRAM(S): at 17:56

## 2018-03-10 RX ADMIN — Medication 81 MILLIGRAM(S): at 12:38

## 2018-03-10 RX ADMIN — MORPHINE SULFATE 15 MILLIGRAM(S): 50 CAPSULE, EXTENDED RELEASE ORAL at 20:58

## 2018-03-10 RX ADMIN — Medication 20 MILLIEQUIVALENT(S): at 12:38

## 2018-03-10 RX ADMIN — Medication 250 MILLIGRAM(S): at 05:18

## 2018-03-10 RX ADMIN — RIVAROXABAN 15 MILLIGRAM(S): KIT at 17:56

## 2018-03-10 RX ADMIN — MORPHINE SULFATE 15 MILLIGRAM(S): 50 CAPSULE, EXTENDED RELEASE ORAL at 21:30

## 2018-03-10 RX ADMIN — Medication 250 MILLIGRAM(S): at 17:56

## 2018-03-10 RX ADMIN — Medication 145 MILLIGRAM(S): at 12:38

## 2018-03-10 RX ADMIN — LISINOPRIL 10 MILLIGRAM(S): 2.5 TABLET ORAL at 05:18

## 2018-03-10 RX ADMIN — Medication 5 MILLIGRAM(S): at 05:18

## 2018-03-10 NOTE — PROGRESS NOTE ADULT - ASSESSMENT
82 year old female with PMH HTN, DMT2, Dyslipidemia, CAD s/p PCI, Chronic pain presents to [facility] after falling at home, after her legs gave way upon arising from bed.  Examination significant for Right periorbital ecchymosis.   UA significant for infection, otherwise investigations unremarkable except for right periorbital hematoma on CTH. await urine culture  Mild dehydration on renal indices

## 2018-03-10 NOTE — PROGRESS NOTE ADULT - SUBJECTIVE AND OBJECTIVE BOX
FLEX MROALES     Chief Complaint: Patient is a 82y old  Female who presents with a chief complaint of Trauma B s/p fall from bed on Xarelto (08 Mar 2018 09:00)      PAST MEDICAL & SURGICAL HISTORY:  Chronic anticoagulation  Chronic pain  Atrial fibrillation, unspecified type  CAD (coronary artery disease)  History of knee replacement, total, bilateral  History of hip replacement      HPI/OVERNIGHT EVENTS: Patient in no distress    MEDICATIONS  (STANDING):  aspirin enteric coated 81 milliGRAM(s) Oral daily  atorvastatin 80 milliGRAM(s) Oral at bedtime  buMETAnide 0.5 milliGRAM(s) Oral every other day  cefTRIAXone   IVPB 1 Gram(s) IV Intermittent every 24 hours  dextrose 5%. 1000 milliLiter(s) (50 mL/Hr) IV Continuous <Continuous>  dextrose 50% Injectable 12.5 Gram(s) IV Push once  dextrose 50% Injectable 25 Gram(s) IV Push once  dextrose 50% Injectable 25 Gram(s) IV Push once  famotidine    Tablet 20 milliGRAM(s) Oral daily  fenofibrate Tablet 145 milliGRAM(s) Oral daily  insulin lispro (HumaLOG) corrective regimen sliding scale   SubCutaneous three times a day before meals  insulin lispro (HumaLOG) corrective regimen sliding scale   SubCutaneous at bedtime  lisinopril 10 milliGRAM(s) Oral daily  metoprolol     tartrate 12.5 milliGRAM(s) Oral two times a day  morphine ER Tablet 15 milliGRAM(s) Oral every 12 hours  oxybutynin 5 milliGRAM(s) Oral two times a day  potassium chloride   Powder 20 milliEquivalent(s) Oral daily  rivaroxaban 15 milliGRAM(s) Oral every 24 hours  saccharomyces boulardii 250 milliGRAM(s) Oral two times a day      Vital Signs Last 24 Hrs  T(C): 36.4 (10 Mar 2018 10:55), Max: 36.9 (09 Mar 2018 15:40)  T(F): 97.5 (10 Mar 2018 10:55), Max: 98.5 (09 Mar 2018 15:40)  HR: 85 (10 Mar 2018 10:55) (65 - 85)  BP: 112/69 (10 Mar 2018 10:55) (105/62 - 134/68)  BP(mean): --  RR: 18 (10 Mar 2018 10:55) (18 - 18)  SpO2: 95% (10 Mar 2018 10:55) (95% - 97%)    PHYSICAL EXAM:  Constitutional: NAD, well-groomed, well-developed  HEENT: PERRLA, EOMI, Normal Hearing, MMM  Neck: No LAD, No JVD  Back: Normal spine flexure, No CVA tenderness  Respiratory: CTAB Cardiovascular: S1 and S2, RRR, no M/G/R  Gastrointestinal:  obese  Extremities: No peripheral edema  Vascular: 2+ peripheral pulses  Neurological: A/O x 3, no focal deficits  Psychiatric: Normal mood, normal affect  Musculoskeletal: 5/5 strength b/l upper and lower extremities  Skin: No rashes    CAPILLARY BLOOD GLUCOSE    LABS:                        10.7   5.8   )-----------( 167      ( 09 Mar 2018 06:23 )             33.7     03-10    144  |  104  |  24.0<H>  ----------------------------<  153<H>  3.8   |  26.0  |  0.81    Ca    8.8      10 Mar 2018 09:16        Urinalysis Basic - ( 08 Mar 2018 17:28 )    Color: Yellow / Appearance: Clear / S.010 / pH: x  Gluc: x / Ketone: Negative  / Bili: Negative / Urobili: 1 mg/dL   Blood: x / Protein: 30 mg/dL / Nitrite: Negative   Leuk Esterase: Small / RBC: 0-2 /HPF / WBC 11-25   Sq Epi: x / Non Sq Epi: Few / Bacteria: Few        RADIOLOGY & ADDITIONAL TESTS:

## 2018-03-11 LAB
CULTURE RESULTS: NO GROWTH — SIGNIFICANT CHANGE UP
GLUCOSE BLDC GLUCOMTR-MCNC: 133 MG/DL — HIGH (ref 70–99)
GLUCOSE BLDC GLUCOMTR-MCNC: 154 MG/DL — HIGH (ref 70–99)
GLUCOSE BLDC GLUCOMTR-MCNC: 156 MG/DL — HIGH (ref 70–99)
GLUCOSE BLDC GLUCOMTR-MCNC: 163 MG/DL — HIGH (ref 70–99)
GLUCOSE BLDC GLUCOMTR-MCNC: 224 MG/DL — HIGH (ref 70–99)
SPECIMEN SOURCE: SIGNIFICANT CHANGE UP

## 2018-03-11 PROCEDURE — 99233 SBSQ HOSP IP/OBS HIGH 50: CPT

## 2018-03-11 PROCEDURE — 93306 TTE W/DOPPLER COMPLETE: CPT | Mod: 26

## 2018-03-11 RX ADMIN — Medication 250 MILLIGRAM(S): at 17:13

## 2018-03-11 RX ADMIN — Medication 81 MILLIGRAM(S): at 13:49

## 2018-03-11 RX ADMIN — Medication 1: at 08:05

## 2018-03-11 RX ADMIN — MORPHINE SULFATE 15 MILLIGRAM(S): 50 CAPSULE, EXTENDED RELEASE ORAL at 21:43

## 2018-03-11 RX ADMIN — Medication 1: at 17:11

## 2018-03-11 RX ADMIN — Medication 145 MILLIGRAM(S): at 13:48

## 2018-03-11 RX ADMIN — Medication 12.5 MILLIGRAM(S): at 19:17

## 2018-03-11 RX ADMIN — Medication 20 MILLIEQUIVALENT(S): at 13:47

## 2018-03-11 RX ADMIN — ATORVASTATIN CALCIUM 80 MILLIGRAM(S): 80 TABLET, FILM COATED ORAL at 21:43

## 2018-03-11 RX ADMIN — MORPHINE SULFATE 15 MILLIGRAM(S): 50 CAPSULE, EXTENDED RELEASE ORAL at 05:49

## 2018-03-11 RX ADMIN — Medication 5 MILLIGRAM(S): at 05:49

## 2018-03-11 RX ADMIN — RIVAROXABAN 15 MILLIGRAM(S): KIT at 17:13

## 2018-03-11 RX ADMIN — Medication 12.5 MILLIGRAM(S): at 05:55

## 2018-03-11 RX ADMIN — Medication 250 MILLIGRAM(S): at 05:49

## 2018-03-11 RX ADMIN — MORPHINE SULFATE 15 MILLIGRAM(S): 50 CAPSULE, EXTENDED RELEASE ORAL at 22:15

## 2018-03-11 RX ADMIN — MORPHINE SULFATE 15 MILLIGRAM(S): 50 CAPSULE, EXTENDED RELEASE ORAL at 06:20

## 2018-03-11 RX ADMIN — CEFTRIAXONE 100 GRAM(S): 500 INJECTION, POWDER, FOR SOLUTION INTRAMUSCULAR; INTRAVENOUS at 05:48

## 2018-03-11 RX ADMIN — BUMETANIDE 0.5 MILLIGRAM(S): 0.25 INJECTION INTRAMUSCULAR; INTRAVENOUS at 13:50

## 2018-03-11 RX ADMIN — Medication 5 MILLIGRAM(S): at 17:14

## 2018-03-11 RX ADMIN — LISINOPRIL 10 MILLIGRAM(S): 2.5 TABLET ORAL at 05:49

## 2018-03-11 RX ADMIN — FAMOTIDINE 20 MILLIGRAM(S): 10 INJECTION INTRAVENOUS at 13:49

## 2018-03-11 NOTE — PROGRESS NOTE ADULT - SUBJECTIVE AND OBJECTIVE BOX
FLEX MORALES     Chief Complaint: Patient is a 82y old  Female who presents with a chief complaint of Trauma B s/p fall from bed on Xarelto (08 Mar 2018 09:00)      PAST MEDICAL & SURGICAL HISTORY:  Chronic anticoagulation  Chronic pain  Atrial fibrillation, unspecified type  CAD (coronary artery disease)  History of knee replacement, total, bilateral  History of hip replacement      HPI/OVERNIGHT EVENTS: Patient lying in bed in no distress.    MEDICATIONS  (STANDING):  aspirin enteric coated 81 milliGRAM(s) Oral daily  atorvastatin 80 milliGRAM(s) Oral at bedtime  buMETAnide 0.5 milliGRAM(s) Oral every other day  cefTRIAXone   IVPB 1 Gram(s) IV Intermittent every 24 hours  dextrose 5%. 1000 milliLiter(s) (50 mL/Hr) IV Continuous <Continuous>  dextrose 50% Injectable 12.5 Gram(s) IV Push once  dextrose 50% Injectable 25 Gram(s) IV Push once  dextrose 50% Injectable 25 Gram(s) IV Push once  famotidine    Tablet 20 milliGRAM(s) Oral daily  fenofibrate Tablet 145 milliGRAM(s) Oral daily  insulin lispro (HumaLOG) corrective regimen sliding scale   SubCutaneous three times a day before meals  insulin lispro (HumaLOG) corrective regimen sliding scale   SubCutaneous at bedtime  lisinopril 10 milliGRAM(s) Oral daily  metoprolol     tartrate 12.5 milliGRAM(s) Oral two times a day  morphine ER Tablet 15 milliGRAM(s) Oral every 12 hours  oxybutynin 5 milliGRAM(s) Oral two times a day  potassium chloride   Powder 20 milliEquivalent(s) Oral daily  rivaroxaban 15 milliGRAM(s) Oral every 24 hours  saccharomyces boulardii 250 milliGRAM(s) Oral two times a day      Vital Signs Last 24 Hrs  T(C): 36.2 (11 Mar 2018 08:05), Max: 36.9 (10 Mar 2018 23:48)  T(F): 97.1 (11 Mar 2018 08:05), Max: 98.4 (10 Mar 2018 23:48)  HR: 83 (11 Mar 2018 08:05) (78 - 92)  BP: 122/68 (11 Mar 2018 08:05) (112/69 - 137/67)  BP(mean): --  RR: 18 (11 Mar 2018 08:05) (17 - 18)  SpO2: 96% (11 Mar 2018 08:05) (93% - 96%)    PHYSICAL EXAM:  Constitutional: NAD, well-groomed, well-developed  HEENT: PERRLA, EOMI, Normal Hearing, MMM  Neck: No LAD, No JVD  Back: Normal spine flexure, No CVA tenderness  Respiratory: CTAB Cardiovascular: S1 and S2, RRR, no M/G/R  Gastrointestinal:  obese   Extremities: No peripheral edema  Vascular: 2+ peripheral pulses  Neurological: A/O x 3, no focal deficits  Psychiatric: Normal mood, normal affect  Musculoskeletal: 5/5 strength b/l upper and lower extremities  Skin: No rashes    CAPILLARY BLOOD GLUCOSE    LABS:    03-10    144  |  104  |  24.0<H>  ----------------------------<  153<H>  3.8   |  26.0  |  0.81    Ca    8.8      10 Mar 2018 09:16            RADIOLOGY & ADDITIONAL TESTS:

## 2018-03-12 DIAGNOSIS — I50.32 CHRONIC DIASTOLIC (CONGESTIVE) HEART FAILURE: ICD-10-CM

## 2018-03-12 DIAGNOSIS — L03.119 CELLULITIS OF UNSPECIFIED PART OF LIMB: ICD-10-CM

## 2018-03-12 LAB
ANION GAP SERPL CALC-SCNC: 13 MMOL/L — SIGNIFICANT CHANGE UP (ref 5–17)
BUN SERPL-MCNC: 52 MG/DL — HIGH (ref 8–20)
CALCIUM SERPL-MCNC: 8.5 MG/DL — LOW (ref 8.6–10.2)
CHLORIDE SERPL-SCNC: 102 MMOL/L — SIGNIFICANT CHANGE UP (ref 98–107)
CHOLEST SERPL-MCNC: 79 MG/DL — LOW (ref 110–199)
CO2 SERPL-SCNC: 25 MMOL/L — SIGNIFICANT CHANGE UP (ref 22–29)
CREAT SERPL-MCNC: 1.22 MG/DL — SIGNIFICANT CHANGE UP (ref 0.5–1.3)
GLUCOSE BLDC GLUCOMTR-MCNC: 163 MG/DL — HIGH (ref 70–99)
GLUCOSE BLDC GLUCOMTR-MCNC: 184 MG/DL — HIGH (ref 70–99)
GLUCOSE BLDC GLUCOMTR-MCNC: 204 MG/DL — HIGH (ref 70–99)
GLUCOSE BLDC GLUCOMTR-MCNC: 211 MG/DL — HIGH (ref 70–99)
GLUCOSE SERPL-MCNC: 177 MG/DL — HIGH (ref 70–115)
HCT VFR BLD CALC: 38 % — SIGNIFICANT CHANGE UP (ref 37–47)
HDLC SERPL-MCNC: 11 MG/DL — LOW
HGB BLD-MCNC: 12.2 G/DL — SIGNIFICANT CHANGE UP (ref 12–16)
LIPID PNL WITH DIRECT LDL SERPL: 38 MG/DL — SIGNIFICANT CHANGE UP
MAGNESIUM SERPL-MCNC: 1.5 MG/DL — LOW (ref 1.6–2.6)
MCHC RBC-ENTMCNC: 27.9 PG — SIGNIFICANT CHANGE UP (ref 27–31)
MCHC RBC-ENTMCNC: 32.1 G/DL — SIGNIFICANT CHANGE UP (ref 32–36)
MCV RBC AUTO: 87 FL — SIGNIFICANT CHANGE UP (ref 81–99)
PHOSPHATE SERPL-MCNC: 4.4 MG/DL — SIGNIFICANT CHANGE UP (ref 2.4–4.7)
PLATELET # BLD AUTO: 266 K/UL — SIGNIFICANT CHANGE UP (ref 150–400)
POTASSIUM SERPL-MCNC: 4.8 MMOL/L — SIGNIFICANT CHANGE UP (ref 3.5–5.3)
POTASSIUM SERPL-SCNC: 4.8 MMOL/L — SIGNIFICANT CHANGE UP (ref 3.5–5.3)
RBC # BLD: 4.37 M/UL — LOW (ref 4.4–5.2)
RBC # FLD: 15.5 % — SIGNIFICANT CHANGE UP (ref 11–15.6)
SODIUM SERPL-SCNC: 140 MMOL/L — SIGNIFICANT CHANGE UP (ref 135–145)
TOTAL CHOLESTEROL/HDL RATIO MEASUREMENT: 7 RATIO — SIGNIFICANT CHANGE UP (ref 3.3–7.1)
TRIGL SERPL-MCNC: 152 MG/DL — SIGNIFICANT CHANGE UP (ref 10–200)
WBC # BLD: 11.2 K/UL — HIGH (ref 4.8–10.8)
WBC # FLD AUTO: 11.2 K/UL — HIGH (ref 4.8–10.8)

## 2018-03-12 PROCEDURE — 99233 SBSQ HOSP IP/OBS HIGH 50: CPT

## 2018-03-12 RX ORDER — ALBUTEROL 90 UG/1
1 AEROSOL, METERED ORAL EVERY 4 HOURS
Qty: 0 | Refills: 0 | Status: DISCONTINUED | OUTPATIENT
Start: 2018-03-12 | End: 2018-03-19

## 2018-03-12 RX ORDER — DOCUSATE SODIUM 100 MG
100 CAPSULE ORAL THREE TIMES A DAY
Qty: 0 | Refills: 0 | Status: DISCONTINUED | OUTPATIENT
Start: 2018-03-12 | End: 2018-03-19

## 2018-03-12 RX ORDER — MAGNESIUM OXIDE 400 MG ORAL TABLET 241.3 MG
400 TABLET ORAL DAILY
Qty: 0 | Refills: 0 | Status: DISCONTINUED | OUTPATIENT
Start: 2018-03-12 | End: 2018-03-19

## 2018-03-12 RX ORDER — SENNA PLUS 8.6 MG/1
2 TABLET ORAL AT BEDTIME
Qty: 0 | Refills: 0 | Status: DISCONTINUED | OUTPATIENT
Start: 2018-03-12 | End: 2018-03-19

## 2018-03-12 RX ORDER — ALBUTEROL 90 UG/1
2.5 AEROSOL, METERED ORAL EVERY 6 HOURS
Qty: 0 | Refills: 0 | Status: DISCONTINUED | OUTPATIENT
Start: 2018-03-12 | End: 2018-03-19

## 2018-03-12 RX ORDER — MAGNESIUM SULFATE 500 MG/ML
2 VIAL (ML) INJECTION ONCE
Qty: 0 | Refills: 0 | Status: COMPLETED | OUTPATIENT
Start: 2018-03-12 | End: 2018-03-12

## 2018-03-12 RX ORDER — CEFAZOLIN SODIUM 1 G
1000 VIAL (EA) INJECTION EVERY 8 HOURS
Qty: 0 | Refills: 0 | Status: DISCONTINUED | OUTPATIENT
Start: 2018-03-12 | End: 2018-03-12

## 2018-03-12 RX ORDER — DIPHENHYDRAMINE HCL 50 MG
50 CAPSULE ORAL ONCE
Qty: 0 | Refills: 0 | Status: COMPLETED | OUTPATIENT
Start: 2018-03-12 | End: 2018-03-12

## 2018-03-12 RX ADMIN — Medication 1: at 16:52

## 2018-03-12 RX ADMIN — Medication 2: at 11:56

## 2018-03-12 RX ADMIN — Medication 100 MILLIGRAM(S): at 13:23

## 2018-03-12 RX ADMIN — Medication 100 MILLIGRAM(S): at 21:29

## 2018-03-12 RX ADMIN — Medication 81 MILLIGRAM(S): at 11:57

## 2018-03-12 RX ADMIN — Medication 5 MILLIGRAM(S): at 05:16

## 2018-03-12 RX ADMIN — CEFTRIAXONE 100 GRAM(S): 500 INJECTION, POWDER, FOR SOLUTION INTRAMUSCULAR; INTRAVENOUS at 05:15

## 2018-03-12 RX ADMIN — Medication 5 MILLIGRAM(S): at 16:53

## 2018-03-12 RX ADMIN — MORPHINE SULFATE 15 MILLIGRAM(S): 50 CAPSULE, EXTENDED RELEASE ORAL at 06:16

## 2018-03-12 RX ADMIN — Medication 50 MILLIGRAM(S): at 21:29

## 2018-03-12 RX ADMIN — ALBUTEROL 2.5 MILLIGRAM(S): 90 AEROSOL, METERED ORAL at 15:59

## 2018-03-12 RX ADMIN — MORPHINE SULFATE 15 MILLIGRAM(S): 50 CAPSULE, EXTENDED RELEASE ORAL at 22:30

## 2018-03-12 RX ADMIN — MAGNESIUM OXIDE 400 MG ORAL TABLET 400 MILLIGRAM(S): 241.3 TABLET ORAL at 11:57

## 2018-03-12 RX ADMIN — RIVAROXABAN 15 MILLIGRAM(S): KIT at 16:53

## 2018-03-12 RX ADMIN — Medication 250 MILLIGRAM(S): at 16:53

## 2018-03-12 RX ADMIN — Medication 12.5 MILLIGRAM(S): at 16:53

## 2018-03-12 RX ADMIN — Medication 250 MILLIGRAM(S): at 05:16

## 2018-03-12 RX ADMIN — Medication 1: at 08:03

## 2018-03-12 RX ADMIN — Medication 50 GRAM(S): at 11:56

## 2018-03-12 RX ADMIN — Medication 200 MILLIGRAM(S): at 16:53

## 2018-03-12 RX ADMIN — LISINOPRIL 10 MILLIGRAM(S): 2.5 TABLET ORAL at 05:16

## 2018-03-12 RX ADMIN — Medication 145 MILLIGRAM(S): at 11:56

## 2018-03-12 RX ADMIN — Medication 100 MILLIGRAM(S): at 11:56

## 2018-03-12 RX ADMIN — MORPHINE SULFATE 15 MILLIGRAM(S): 50 CAPSULE, EXTENDED RELEASE ORAL at 05:16

## 2018-03-12 RX ADMIN — MORPHINE SULFATE 15 MILLIGRAM(S): 50 CAPSULE, EXTENDED RELEASE ORAL at 21:40

## 2018-03-12 RX ADMIN — Medication 20 MILLIEQUIVALENT(S): at 11:57

## 2018-03-12 RX ADMIN — SENNA PLUS 2 TABLET(S): 8.6 TABLET ORAL at 21:32

## 2018-03-12 RX ADMIN — ALBUTEROL 2.5 MILLIGRAM(S): 90 AEROSOL, METERED ORAL at 20:41

## 2018-03-12 RX ADMIN — Medication 12.5 MILLIGRAM(S): at 05:16

## 2018-03-12 RX ADMIN — FAMOTIDINE 20 MILLIGRAM(S): 10 INJECTION INTRAVENOUS at 11:57

## 2018-03-12 NOTE — PROGRESS NOTE ADULT - PROBLEM SELECTOR PLAN 1
Continue Rocephin, Urine culture negative. Continue Rocephin, Urine culture negative. 3/12 Patient has completed regimen of rocephin for uti Urine culture negative. 3/12 Patient has completed regimen of rocephin for uti

## 2018-03-12 NOTE — CHART NOTE - NSCHARTNOTEFT_GEN_A_CORE
PA note  Called by RN to evaluate patient with rash on back, chest, arms and stomach noted after her first dose of IVPB Kefzol. Patient states she has a known allergy to Cipro and Latex. She admits to all over itchiness but denies any SOB. Kefzol was started today for bilateral leg cellulitis.  VSS T:98.0, BP:110/84, P:86   Skin: Positive erythematous flat rash noted on back, chest and arms.   A/P: Possible drug reaction to Kefzol    1)D/C Kefzol    2)ordered Benadryl 50mg PO now    3) Spoke with , she said she will come by and see the patient

## 2018-03-12 NOTE — PROGRESS NOTE ADULT - SUBJECTIVE AND OBJECTIVE BOX
FLEX MORALES     Chief Complaint: Patient is a 82y old  Female who presents with a chief complaint of Trauma B s/p fall from bed on Xarelto (08 Mar 2018 09:00)      PAST MEDICAL & SURGICAL HISTORY:  Chronic anticoagulation  Chronic pain  Atrial fibrillation, unspecified type  CAD (coronary artery disease)  History of knee replacement, total, bilateral  History of hip replacement      HPI/OVERNIGHT EVENTS: Patient complaining of cough and constipation, otherwise stable. Her wbc is elevated but no obvious signs of any exacerbation of infection.    MEDICATIONS  (STANDING):  ALBUTerol    0.083% 2.5 milliGRAM(s) Nebulizer every 6 hours  ALBUTerol    90 MICROgram(s) HFA Inhaler 1 Puff(s) Inhalation every 4 hours  aspirin enteric coated 81 milliGRAM(s) Oral daily  atorvastatin 80 milliGRAM(s) Oral at bedtime  buMETAnide 0.5 milliGRAM(s) Oral every other day  cefTRIAXone   IVPB 1 Gram(s) IV Intermittent every 24 hours  dextrose 5%. 1000 milliLiter(s) (50 mL/Hr) IV Continuous <Continuous>  dextrose 50% Injectable 12.5 Gram(s) IV Push once  dextrose 50% Injectable 25 Gram(s) IV Push once  dextrose 50% Injectable 25 Gram(s) IV Push once  docusate sodium 100 milliGRAM(s) Oral three times a day  famotidine    Tablet 20 milliGRAM(s) Oral daily  fenofibrate Tablet 145 milliGRAM(s) Oral daily  insulin lispro (HumaLOG) corrective regimen sliding scale   SubCutaneous three times a day before meals  insulin lispro (HumaLOG) corrective regimen sliding scale   SubCutaneous at bedtime  lisinopril 10 milliGRAM(s) Oral daily  metoprolol     tartrate 12.5 milliGRAM(s) Oral two times a day  morphine ER Tablet 15 milliGRAM(s) Oral every 12 hours  oxybutynin 5 milliGRAM(s) Oral two times a day  potassium chloride   Powder 20 milliEquivalent(s) Oral daily  rivaroxaban 15 milliGRAM(s) Oral every 24 hours  saccharomyces boulardii 250 milliGRAM(s) Oral two times a day  senna 2 Tablet(s) Oral at bedtime      Vital Signs Last 24 Hrs  T(C): 37.2 (12 Mar 2018 06:08), Max: 37.2 (12 Mar 2018 06:08)  T(F): 98.9 (12 Mar 2018 06:08), Max: 98.9 (12 Mar 2018 06:08)  HR: 87 (12 Mar 2018 06:08) (80 - 94)  BP: 112/80 (12 Mar 2018 06:08) (112/80 - 124/69)  BP(mean): --  RR: 17 (12 Mar 2018 06:08) (17 - 18)  SpO2: 98% (12 Mar 2018 06:08) (96% - 98%)    PHYSICAL EXAM:  Constitutional: obese, elderly female  HEENT: PERRLA, EOMI, Normal Hearing, MMM  Neck: No LAD, No JVD  Back: Normal spine flexure, No CVA tenderness  Respiratory: expiratory wheeze   Cardiovascular: S1 and S2, RRR, no M/G/R  Gastrointestinal: BS+, soft, NT/ND  Extremities: No peripheral edema  Vascular: 2+ peripheral pulses  Neurological: A/O x 3, no focal deficits  Psychiatric: Normal mood, normal affect     CAPILLARY BLOOD GLUCOSE    LABS:                        12.2   11.2  )-----------( 266      ( 12 Mar 2018 09:29 )             38.0     03-12    140  |  102  |  52.0<H>  ----------------------------<  177<H>  4.8   |  25.0  |  1.22    Ca    8.5<L>      12 Mar 2018 09:29  Phos  4.4     03-12  Mg     1.5     03-12            RADIOLOGY & ADDITIONAL TESTS: FLEX MORALES     Chief Complaint: Patient is a 82y old  Female who presents with a chief complaint of Trauma B s/p fall from bed on Xarelto (08 Mar 2018 09:00)      PAST MEDICAL & SURGICAL HISTORY:  Chronic anticoagulation  Chronic pain  Atrial fibrillation, unspecified type  CAD (coronary artery disease)  History of knee replacement, total, bilateral  History of hip replacement      HPI/OVERNIGHT EVENTS: Patient complaining of cough and constipation, otherwise stable. Her wbc is elevated but no obvious signs of any exacerbation of infection.    MEDICATIONS  (STANDING):  ALBUTerol    0.083% 2.5 milliGRAM(s) Nebulizer every 6 hours  ALBUTerol    90 MICROgram(s) HFA Inhaler 1 Puff(s) Inhalation every 4 hours  aspirin enteric coated 81 milliGRAM(s) Oral daily  atorvastatin 80 milliGRAM(s) Oral at bedtime  buMETAnide 0.5 milliGRAM(s) Oral every other day  cefTRIAXone   IVPB 1 Gram(s) IV Intermittent every 24 hours  dextrose 5%. 1000 milliLiter(s) (50 mL/Hr) IV Continuous <Continuous>  dextrose 50% Injectable 12.5 Gram(s) IV Push once  dextrose 50% Injectable 25 Gram(s) IV Push once  dextrose 50% Injectable 25 Gram(s) IV Push once  docusate sodium 100 milliGRAM(s) Oral three times a day  famotidine    Tablet 20 milliGRAM(s) Oral daily  fenofibrate Tablet 145 milliGRAM(s) Oral daily  insulin lispro (HumaLOG) corrective regimen sliding scale   SubCutaneous three times a day before meals  insulin lispro (HumaLOG) corrective regimen sliding scale   SubCutaneous at bedtime  lisinopril 10 milliGRAM(s) Oral daily  metoprolol     tartrate 12.5 milliGRAM(s) Oral two times a day  morphine ER Tablet 15 milliGRAM(s) Oral every 12 hours  oxybutynin 5 milliGRAM(s) Oral two times a day  potassium chloride   Powder 20 milliEquivalent(s) Oral daily  rivaroxaban 15 milliGRAM(s) Oral every 24 hours  saccharomyces boulardii 250 milliGRAM(s) Oral two times a day  senna 2 Tablet(s) Oral at bedtime      Vital Signs Last 24 Hrs  T(C): 37.2 (12 Mar 2018 06:08), Max: 37.2 (12 Mar 2018 06:08)  T(F): 98.9 (12 Mar 2018 06:08), Max: 98.9 (12 Mar 2018 06:08)  HR: 87 (12 Mar 2018 06:08) (80 - 94)  BP: 112/80 (12 Mar 2018 06:08) (112/80 - 124/69)  BP(mean): --  RR: 17 (12 Mar 2018 06:08) (17 - 18)  SpO2: 98% (12 Mar 2018 06:08) (96% - 98%)    PHYSICAL EXAM:  Constitutional: obese, elderly female  HEENT: PERRLA, EOMI, Normal Hearing, MMM  Neck: No LAD, No JVD  Back: Normal spine flexure, No CVA tenderness  Respiratory: expiratory wheeze   Cardiovascular: S1 and S2, RRR, no M/G/R  Gastrointestinal: BS+, soft, NT/ND  Extremities: red hot swollen pretibial area  Vascular: 2+ peripheral pulses  Neurological: A/O x 3, no focal deficits  Psychiatric: Normal mood, normal affect     CAPILLARY BLOOD GLUCOSE    LABS:                        12.2   11.2  )-----------( 266      ( 12 Mar 2018 09:29 )             38.0     03-12    140  |  102  |  52.0<H>  ----------------------------<  177<H>  4.8   |  25.0  |  1.22    Ca    8.5<L>      12 Mar 2018 09:29  Phos  4.4     03-12  Mg     1.5     03-12            RADIOLOGY & ADDITIONAL TESTS:

## 2018-03-12 NOTE — PROGRESS NOTE ADULT - PROBLEM SELECTOR PLAN 2
Fall precautions, PT, LESLIE likely on 03/12/18 Fall precautions, PT, LESLIE likely on 03/13/18 Fall precautions, PT,  for LESLIE when stable

## 2018-03-12 NOTE — PROGRESS NOTE ADULT - ASSESSMENT
82 year old female with PMH HTN, DMT2, Dyslipidemia, CAD s/p PCI, Chronic pain presents to [facility] after falling at home, after her legs gave way upon arising from bed. Examination significant for Right periorbital ecchymosis. UA significant for infection, otherwise investigations unremarkable except for right periorbital hematoma on CTH. await urine culture. Mild dehydration on renal indices on admission. 82 year old female with PMH HTN, DMT2, Dyslipidemia, CAD s/p PCI, Chronic pain presents to [facility] after falling at home, after her legs gave way upon arising from bed. Examination significant for Right periorbital ecchymosis. UA significant for infection, otherwise investigations unremarkable except for right periorbital hematoma on CTH. await urine culture. Mild dehydration on renal indices on admission. On 3/12 her lower extremity are red hot swollen, treatment initiated for cellulitis.

## 2018-03-12 NOTE — PROGRESS NOTE ADULT - PROBLEM SELECTOR PLAN 4
Carb consistent diet, Continue Accuchecks, Sliding scale Insulins  A1c - 7, Resume home OHA upon discharge Carb consistent diet, Continue Accuchecks, Sliding scale Insulins  A1c - 7,

## 2018-03-13 DIAGNOSIS — E11.49 TYPE 2 DIABETES MELLITUS WITH OTHER DIABETIC NEUROLOGICAL COMPLICATION: ICD-10-CM

## 2018-03-13 DIAGNOSIS — N17.9 ACUTE KIDNEY FAILURE, UNSPECIFIED: ICD-10-CM

## 2018-03-13 LAB
ANION GAP SERPL CALC-SCNC: 14 MMOL/L — SIGNIFICANT CHANGE UP (ref 5–17)
ANION GAP SERPL CALC-SCNC: 20 MMOL/L — HIGH (ref 5–17)
APPEARANCE UR: CLEAR — SIGNIFICANT CHANGE UP
BACTERIA # UR AUTO: ABNORMAL
BASOPHILS # BLD AUTO: 0 K/UL — SIGNIFICANT CHANGE UP (ref 0–0.2)
BASOPHILS NFR BLD AUTO: 0.1 % — SIGNIFICANT CHANGE UP (ref 0–2)
BILIRUB UR-MCNC: NEGATIVE — SIGNIFICANT CHANGE UP
BUN SERPL-MCNC: 68 MG/DL — HIGH (ref 8–20)
BUN SERPL-MCNC: 69 MG/DL — HIGH (ref 8–20)
CALCIUM SERPL-MCNC: 8.2 MG/DL — LOW (ref 8.6–10.2)
CALCIUM SERPL-MCNC: 8.7 MG/DL — SIGNIFICANT CHANGE UP (ref 8.6–10.2)
CHLORIDE SERPL-SCNC: 102 MMOL/L — SIGNIFICANT CHANGE UP (ref 98–107)
CHLORIDE SERPL-SCNC: 97 MMOL/L — LOW (ref 98–107)
CO2 SERPL-SCNC: 17 MMOL/L — LOW (ref 22–29)
CO2 SERPL-SCNC: 22 MMOL/L — SIGNIFICANT CHANGE UP (ref 22–29)
COLOR SPEC: YELLOW — SIGNIFICANT CHANGE UP
CREAT SERPL-MCNC: 2.38 MG/DL — HIGH (ref 0.5–1.3)
CREAT SERPL-MCNC: 2.4 MG/DL — HIGH (ref 0.5–1.3)
DIFF PNL FLD: ABNORMAL
EOSINOPHIL # BLD AUTO: 0.3 K/UL — SIGNIFICANT CHANGE UP (ref 0–0.5)
EOSINOPHIL NFR BLD AUTO: 3.3 % — SIGNIFICANT CHANGE UP (ref 0–6)
EPI CELLS # UR: SIGNIFICANT CHANGE UP
GLUCOSE BLDC GLUCOMTR-MCNC: 134 MG/DL — HIGH (ref 70–99)
GLUCOSE BLDC GLUCOMTR-MCNC: 142 MG/DL — HIGH (ref 70–99)
GLUCOSE BLDC GLUCOMTR-MCNC: 155 MG/DL — HIGH (ref 70–99)
GLUCOSE BLDC GLUCOMTR-MCNC: 181 MG/DL — HIGH (ref 70–99)
GLUCOSE SERPL-MCNC: 160 MG/DL — HIGH (ref 70–115)
GLUCOSE SERPL-MCNC: 187 MG/DL — HIGH (ref 70–115)
GLUCOSE UR QL: NEGATIVE MG/DL — SIGNIFICANT CHANGE UP
HCT VFR BLD CALC: 34.8 % — LOW (ref 37–47)
HGB BLD-MCNC: 11 G/DL — LOW (ref 12–16)
KETONES UR-MCNC: NEGATIVE — SIGNIFICANT CHANGE UP
LEUKOCYTE ESTERASE UR-ACNC: ABNORMAL
LYMPHOCYTES # BLD AUTO: 0.8 K/UL — LOW (ref 1–4.8)
LYMPHOCYTES # BLD AUTO: 8.2 % — LOW (ref 20–55)
MAGNESIUM SERPL-MCNC: 1.8 MG/DL — SIGNIFICANT CHANGE UP (ref 1.6–2.6)
MCHC RBC-ENTMCNC: 27.7 PG — SIGNIFICANT CHANGE UP (ref 27–31)
MCHC RBC-ENTMCNC: 31.6 G/DL — LOW (ref 32–36)
MCV RBC AUTO: 87.7 FL — SIGNIFICANT CHANGE UP (ref 81–99)
MONOCYTES # BLD AUTO: 0.4 K/UL — SIGNIFICANT CHANGE UP (ref 0–0.8)
MONOCYTES NFR BLD AUTO: 4.6 % — SIGNIFICANT CHANGE UP (ref 3–10)
NEUTROPHILS # BLD AUTO: 7.6 K/UL — SIGNIFICANT CHANGE UP (ref 1.8–8)
NEUTROPHILS NFR BLD AUTO: 82.4 % — HIGH (ref 37–73)
NITRITE UR-MCNC: NEGATIVE — SIGNIFICANT CHANGE UP
PH UR: 5 — SIGNIFICANT CHANGE UP (ref 5–8)
PLATELET # BLD AUTO: 237 K/UL — SIGNIFICANT CHANGE UP (ref 150–400)
POTASSIUM SERPL-MCNC: 5.1 MMOL/L — SIGNIFICANT CHANGE UP (ref 3.5–5.3)
POTASSIUM SERPL-MCNC: 6.4 MMOL/L — CRITICAL HIGH (ref 3.5–5.3)
POTASSIUM SERPL-SCNC: 5.1 MMOL/L — SIGNIFICANT CHANGE UP (ref 3.5–5.3)
POTASSIUM SERPL-SCNC: 6.4 MMOL/L — CRITICAL HIGH (ref 3.5–5.3)
PROT UR-MCNC: 30 MG/DL
RBC # BLD: 3.97 M/UL — LOW (ref 4.4–5.2)
RBC # FLD: 15.9 % — HIGH (ref 11–15.6)
RBC CASTS # UR COMP ASSIST: ABNORMAL /HPF (ref 0–4)
SODIUM SERPL-SCNC: 134 MMOL/L — LOW (ref 135–145)
SODIUM SERPL-SCNC: 138 MMOL/L — SIGNIFICANT CHANGE UP (ref 135–145)
SODIUM UR-SCNC: 59 MMOL/L — SIGNIFICANT CHANGE UP
SP GR SPEC: 1.02 — SIGNIFICANT CHANGE UP (ref 1.01–1.02)
UROBILINOGEN FLD QL: 1 MG/DL
WBC # BLD: 9.3 K/UL — SIGNIFICANT CHANGE UP (ref 4.8–10.8)
WBC # FLD AUTO: 9.3 K/UL — SIGNIFICANT CHANGE UP (ref 4.8–10.8)
WBC UR QL: ABNORMAL

## 2018-03-13 PROCEDURE — 76775 US EXAM ABDO BACK WALL LIM: CPT | Mod: 26

## 2018-03-13 PROCEDURE — 99233 SBSQ HOSP IP/OBS HIGH 50: CPT

## 2018-03-13 RX ORDER — DIPHENHYDRAMINE HCL 50 MG
50 CAPSULE ORAL ONCE
Qty: 0 | Refills: 0 | Status: COMPLETED | OUTPATIENT
Start: 2018-03-13 | End: 2018-03-13

## 2018-03-13 RX ORDER — SODIUM CHLORIDE 9 MG/ML
1000 INJECTION INTRAMUSCULAR; INTRAVENOUS; SUBCUTANEOUS
Qty: 0 | Refills: 0 | Status: DISCONTINUED | OUTPATIENT
Start: 2018-03-13 | End: 2018-03-14

## 2018-03-13 RX ADMIN — Medication 200 MILLIGRAM(S): at 00:57

## 2018-03-13 RX ADMIN — Medication 5 MILLIGRAM(S): at 06:00

## 2018-03-13 RX ADMIN — Medication 250 MILLIGRAM(S): at 06:00

## 2018-03-13 RX ADMIN — Medication 145 MILLIGRAM(S): at 11:11

## 2018-03-13 RX ADMIN — ALBUTEROL 2.5 MILLIGRAM(S): 90 AEROSOL, METERED ORAL at 20:39

## 2018-03-13 RX ADMIN — Medication 100 MILLIGRAM(S): at 06:00

## 2018-03-13 RX ADMIN — RIVAROXABAN 15 MILLIGRAM(S): KIT at 17:37

## 2018-03-13 RX ADMIN — Medication 12.5 MILLIGRAM(S): at 06:01

## 2018-03-13 RX ADMIN — MORPHINE SULFATE 15 MILLIGRAM(S): 50 CAPSULE, EXTENDED RELEASE ORAL at 11:45

## 2018-03-13 RX ADMIN — ATORVASTATIN CALCIUM 80 MILLIGRAM(S): 80 TABLET, FILM COATED ORAL at 22:04

## 2018-03-13 RX ADMIN — SENNA PLUS 2 TABLET(S): 8.6 TABLET ORAL at 22:04

## 2018-03-13 RX ADMIN — Medication 5 MILLIGRAM(S): at 17:37

## 2018-03-13 RX ADMIN — MORPHINE SULFATE 15 MILLIGRAM(S): 50 CAPSULE, EXTENDED RELEASE ORAL at 23:41

## 2018-03-13 RX ADMIN — ALBUTEROL 2.5 MILLIGRAM(S): 90 AEROSOL, METERED ORAL at 08:42

## 2018-03-13 RX ADMIN — Medication 250 MILLIGRAM(S): at 17:37

## 2018-03-13 RX ADMIN — Medication 50 MILLIGRAM(S): at 22:04

## 2018-03-13 RX ADMIN — Medication 100 MILLIGRAM(S): at 14:42

## 2018-03-13 RX ADMIN — MORPHINE SULFATE 15 MILLIGRAM(S): 50 CAPSULE, EXTENDED RELEASE ORAL at 11:12

## 2018-03-13 RX ADMIN — LISINOPRIL 10 MILLIGRAM(S): 2.5 TABLET ORAL at 06:00

## 2018-03-13 RX ADMIN — Medication 100 MILLIGRAM(S): at 22:04

## 2018-03-13 RX ADMIN — ATORVASTATIN CALCIUM 80 MILLIGRAM(S): 80 TABLET, FILM COATED ORAL at 04:16

## 2018-03-13 RX ADMIN — MAGNESIUM OXIDE 400 MG ORAL TABLET 400 MILLIGRAM(S): 241.3 TABLET ORAL at 11:11

## 2018-03-13 RX ADMIN — Medication 300 MILLIGRAM(S): at 23:41

## 2018-03-13 RX ADMIN — Medication 300 MILLIGRAM(S): at 17:37

## 2018-03-13 RX ADMIN — Medication 1: at 07:54

## 2018-03-13 RX ADMIN — ALBUTEROL 2.5 MILLIGRAM(S): 90 AEROSOL, METERED ORAL at 14:40

## 2018-03-13 RX ADMIN — BUMETANIDE 0.5 MILLIGRAM(S): 0.25 INJECTION INTRAMUSCULAR; INTRAVENOUS at 11:11

## 2018-03-13 RX ADMIN — ALBUTEROL 2.5 MILLIGRAM(S): 90 AEROSOL, METERED ORAL at 03:30

## 2018-03-13 RX ADMIN — Medication 200 MILLIGRAM(S): at 12:12

## 2018-03-13 RX ADMIN — Medication 81 MILLIGRAM(S): at 11:11

## 2018-03-13 RX ADMIN — FAMOTIDINE 20 MILLIGRAM(S): 10 INJECTION INTRAVENOUS at 11:11

## 2018-03-13 NOTE — CONSULT NOTE ADULT - SUBJECTIVE AND OBJECTIVE BOX
Patient is a 82y old  Female who presents with a chief complaint of Trauma B s/p fall from bed on Xarelto (08 Mar 2018 09:00)      HPI:  Patient is a 81 yo F who was BIBEMS code Trauma B s/p fall from standing on Xarelto. Patient has a hx of R knee repair and reports her knee gave out when she was getting out of bed. She states she hit her right eye on a chair while she was falling. Pt remembers everything, denies LOC. On arrival to trauma bay, patient AAOx3, in NAD, with a GCS of 15. Has periorbital ecchymosis and some dried blood in her oropharynx. Has some pain in her R knee but denies any other complaints. CXR negative. Sent for CT of head and c-spine.    PRIMARY SURVEY:  A: airway patent, speaking in full sentences  B: bilateral breath sounds CTA  C: +2 central pulses, no signs of external hemorrhage  D: GCS 15, Pupils 3mm ERRLA  E: full exposure obtained    PRIMARY:  A: airway intact, speaking full sentences  B: equal bilateral breath sounds, CTA  C: +2 central pulses, no external hemorrhage  D: GCS 15, pupils 3mm, no deformities, no stepoffs  E: full exposure obtained      SECONDARY:  Appearance: NAD	  HEENT:   GCS 15, 3mm, PERRL, EOMI, Right periorbital ecchymosis, dried blood in oropharynx  Chest: S1, S2, No JVD, no tracheal deviation, b/l breath sounds, CTA  Abdomen:  Soft, Non-tender, Non-distended  Skin: No abrasions, lacerations or contusions  Neurologic:  A&Ox3, no sensory or motor deficits  Musculoskeletal: Normal range of motion, strength 5/5 in all extremities, no bony stepoffs  Vascular: Peripheral pulses palpable 2+ bilaterally (08 Mar 2018 09:00)    \Above noted . Progressive decline in renal function . No Radioconrast . Developed diffuse rash from Ancef ---> held . Episode of hypotension   Currently on ACE and Bumex    PAST MEDICAL & SURGICAL HISTORY:  Chronic anticoagulation  Chronic pain  Atrial fibrillation, unspecified type  CAD (coronary artery disease)  History of knee replacement, total, bilateral  History of hip replacement      FAMILY HISTORY:      Social History:    MEDICATIONS  (STANDING):  ALBUTerol    0.083% 2.5 milliGRAM(s) Nebulizer every 6 hours  ALBUTerol    90 MICROgram(s) HFA Inhaler 1 Puff(s) Inhalation every 4 hours  aspirin enteric coated 81 milliGRAM(s) Oral daily  atorvastatin 80 milliGRAM(s) Oral at bedtime  clindamycin   Capsule 300 milliGRAM(s) Oral four times a day  dextrose 5%. 1000 milliLiter(s) (50 mL/Hr) IV Continuous <Continuous>  dextrose 50% Injectable 12.5 Gram(s) IV Push once  dextrose 50% Injectable 25 Gram(s) IV Push once  dextrose 50% Injectable 25 Gram(s) IV Push once  docusate sodium 100 milliGRAM(s) Oral three times a day  famotidine    Tablet 20 milliGRAM(s) Oral daily  insulin lispro (HumaLOG) corrective regimen sliding scale   SubCutaneous three times a day before meals  insulin lispro (HumaLOG) corrective regimen sliding scale   SubCutaneous at bedtime  magnesium oxide 400 milliGRAM(s) Oral daily  metoprolol     tartrate 12.5 milliGRAM(s) Oral two times a day  morphine ER Tablet 15 milliGRAM(s) Oral every 12 hours  oxybutynin 5 milliGRAM(s) Oral two times a day  rivaroxaban 15 milliGRAM(s) Oral every 24 hours  saccharomyces boulardii 250 milliGRAM(s) Oral two times a day  senna 2 Tablet(s) Oral at bedtime  sodium chloride 0.9%. 1000 milliLiter(s) (75 mL/Hr) IV Continuous <Continuous>    MEDICATIONS  (PRN):  bisacodyl Suppository 10 milliGRAM(s) Rectal daily PRN Constipation  cyclobenzaprine 10 milliGRAM(s) Oral at bedtime PRN Muscle Spasm  dextrose Gel 1 Dose(s) Oral once PRN Blood Glucose LESS THAN 70 milliGRAM(s)/deciliter  glucagon  Injectable 1 milliGRAM(s) IntraMuscular once PRN Glucose LESS THAN 70 milligrams/deciliter  guaiFENesin    Syrup 200 milliGRAM(s) Oral every 6 hours PRN Cough      Allergies    Cipro (Rash)  latex (Rash)    Intolerances          Vital Signs Last 24 Hrs  T(C): 36.3 (13 Mar 2018 07:45), Max: 37.1 (13 Mar 2018 05:58)  T(F): 97.3 (13 Mar 2018 07:45), Max: 98.7 (13 Mar 2018 05:58)  HR: 91 (13 Mar 2018 07:45) (75 - 91)  BP: 92/52 (13 Mar 2018 07:45) (92/52 - 118/65)  BP(mean): --  RR: 18 (13 Mar 2018 07:45) (16 - 18)  SpO2: 98% (13 Mar 2018 07:45) (95% - 98%)  Daily     Daily   I&O's Detail    I&O's Summary      PHYSICAL EXAM:    GENERAL: NAD,   HEAD:  No facial edema   NECK: Supple, No JVD,  CHEST/LUNG: EAE , No wheeze   HEART: Regular rate and rhythm; No murmurs, rubs, or gallops  ABDOMEN: Obese , nontender   EXTREMITIES:  chronic edema     LABS:                        11.0   9.3   )-----------( 237      ( 13 Mar 2018 06:56 )             34.8     03-13    138  |  102  |  68.0<H>  ----------------------------<  160<H>  5.1   |  22.0  |  2.38<H>    Ca    8.2<L>      13 Mar 2018 06:56  Phos  4.4     03-12  Mg     1.8     03-13   EXAM:  XR CHEST AP OR PA 1V                          PROCEDURE DATE:  03/08/2018          INTERPRETATION:  Exam Date: 3/8/2018 9:13 AM    History: Trauma    Technique: Single frontal portable view of the chest with comparison to    2/26/2006    Findings:    Heart is enlarged however may be secondary to AP projection. Studies   limited by poor aspiration. Slight increased markings at the lung bases   likely secondary to poor inspiration. No focal consolidation.. The apices   and hemidiaphragms are unremarkable. Degenerative changes of the   visualized osseous structures.        Impression:    No acute disease      ECHO 3-11-18   Summary:   1. Left ventricular ejection fraction, by visual estimation, is 60 to   65%.   2. Normal global left ventricular systolic function.   3. Spectral Doppler shows impaired relaxation pattern of left   ventricular myocardial filling (Grade I diastolic dysfunction).   4. Mild mitral annular calcification.    MD Sienna Electronically signed on 3/11/2018 at 5:47:05 PM              *** Final ***          Magnesium, Serum: 1.8 mg/dL (03-13 @ 06:56)          RADIOLOGY & ADDITIONAL TESTS:

## 2018-03-13 NOTE — PROGRESS NOTE ADULT - SUBJECTIVE AND OBJECTIVE BOX
FLEX MORALES     Chief Complaint: Patient is a 82y old  Female who presents with a chief complaint of Trauma B s/p fall from bed on Xarelto (08 Mar 2018 09:00)      PAST MEDICAL & SURGICAL HISTORY:  Chronic anticoagulation  Chronic pain  Atrial fibrillation, unspecified type  CAD (coronary artery disease)  History of knee replacement, total, bilateral  History of hip replacement      HPI/OVERNIGHT EVENTS: Patient had an allergic reaction to kefzol last night and now has markedly elevated creatinine today    MEDICATIONS  (STANDING):  ALBUTerol    0.083% 2.5 milliGRAM(s) Nebulizer every 6 hours  ALBUTerol    90 MICROgram(s) HFA Inhaler 1 Puff(s) Inhalation every 4 hours  aspirin enteric coated 81 milliGRAM(s) Oral daily  atorvastatin 80 milliGRAM(s) Oral at bedtime  buMETAnide 0.5 milliGRAM(s) Oral every other day  clindamycin   Capsule 300 milliGRAM(s) Oral four times a day  dextrose 5%. 1000 milliLiter(s) (50 mL/Hr) IV Continuous <Continuous>  dextrose 50% Injectable 12.5 Gram(s) IV Push once  dextrose 50% Injectable 25 Gram(s) IV Push once  dextrose 50% Injectable 25 Gram(s) IV Push once  docusate sodium 100 milliGRAM(s) Oral three times a day  famotidine    Tablet 20 milliGRAM(s) Oral daily  fenofibrate Tablet 145 milliGRAM(s) Oral daily  insulin lispro (HumaLOG) corrective regimen sliding scale   SubCutaneous three times a day before meals  insulin lispro (HumaLOG) corrective regimen sliding scale   SubCutaneous at bedtime  lisinopril 10 milliGRAM(s) Oral daily  magnesium oxide 400 milliGRAM(s) Oral daily  metoprolol     tartrate 12.5 milliGRAM(s) Oral two times a day  morphine ER Tablet 15 milliGRAM(s) Oral every 12 hours  oxybutynin 5 milliGRAM(s) Oral two times a day  rivaroxaban 15 milliGRAM(s) Oral every 24 hours  saccharomyces boulardii 250 milliGRAM(s) Oral two times a day  senna 2 Tablet(s) Oral at bedtime      Vital Signs Last 24 Hrs  T(C): 36.3 (13 Mar 2018 07:45), Max: 37.1 (13 Mar 2018 05:58)  T(F): 97.3 (13 Mar 2018 07:45), Max: 98.7 (13 Mar 2018 05:58)  HR: 91 (13 Mar 2018 07:45) (75 - 91)  BP: 92/52 (13 Mar 2018 07:45) (92/52 - 118/65)  BP(mean): --  RR: 18 (13 Mar 2018 07:45) (16 - 18)  SpO2: 98% (13 Mar 2018 07:45) (95% - 98%)    PHYSICAL EXAM:  Constitutional: frail elderly obese female  HEENT: PERRLA, EOMI, Normal Hearing, MMM  Neck: No LAD, No JVD  Back: Normal spine flexure, No CVA tenderness  Respiratory: CTAB Cardiovascular: S1 and S2, RRR, no M/G/R  Gastrointestinal: BS+, soft, NT/ND  Extremities:  two plus edema  Vascular: 2+ peripheral pulses  Neurological: A/O x 3, no focal deficits  Psychiatric: Normal mood, normal affect     CAPILLARY BLOOD GLUCOSE    LABS:                        11.0   9.3   )-----------( 237      ( 13 Mar 2018 06:56 )             34.8     03-13    138  |  102  |  68.0<H>  ----------------------------<  160<H>  5.1   |  22.0  |  2.38<H>    Ca    8.2<L>      13 Mar 2018 06:56  Phos  4.4     03-12  Mg     1.8     03-13            RADIOLOGY & ADDITIONAL TESTS:

## 2018-03-13 NOTE — PROGRESS NOTE ADULT - ASSESSMENT
82 year old female with PMH HTN, DMT2, Dyslipidemia, CAD s/p PCI, Chronic pain presents to [facility] after falling at home, after her legs gave way upon arising from bed. Examination significant for Right periorbital ecchymosis. UA significant for infection, otherwise investigations unremarkable except for right periorbital hematoma on CTH. await urine culture. Mild dehydration on renal indices on admission. On 3/12 her lower extremity are red hot swollen, treatment initiated for cellulitis. On 3/13 Antibiotics were changed to clindamycin secondary to rash from kefzol and Nephrology consulted for elevated creatinine.

## 2018-03-13 NOTE — PROGRESS NOTE ADULT - PROBLEM SELECTOR PLAN 5
Continue Statin and fenofibrate Continue Statin and discontinue fenofibrate as triglycerides are normal.  Repeat lipid panel as an outpatient.

## 2018-03-14 LAB
ALBUMIN SERPL ELPH-MCNC: 2.4 G/DL — LOW (ref 3.3–5.2)
ALP SERPL-CCNC: 42 U/L — SIGNIFICANT CHANGE UP (ref 40–120)
ALT FLD-CCNC: 13 U/L — SIGNIFICANT CHANGE UP
ANION GAP SERPL CALC-SCNC: 13 MMOL/L — SIGNIFICANT CHANGE UP (ref 5–17)
AST SERPL-CCNC: 22 U/L — SIGNIFICANT CHANGE UP
BILIRUB SERPL-MCNC: 0.4 MG/DL — SIGNIFICANT CHANGE UP (ref 0.4–2)
BUN SERPL-MCNC: 79 MG/DL — HIGH (ref 8–20)
CALCIUM SERPL-MCNC: 8.1 MG/DL — LOW (ref 8.6–10.2)
CHLORIDE SERPL-SCNC: 102 MMOL/L — SIGNIFICANT CHANGE UP (ref 98–107)
CK MB CFR SERPL CALC: 6.7 NG/ML — SIGNIFICANT CHANGE UP (ref 0–6.7)
CK SERPL-CCNC: 403 U/L — HIGH (ref 25–170)
CO2 SERPL-SCNC: 23 MMOL/L — SIGNIFICANT CHANGE UP (ref 22–29)
CREAT SERPL-MCNC: 2.9 MG/DL — HIGH (ref 0.5–1.3)
EOSINOPHIL # BLD: 460 /UL — HIGH (ref 50–350)
EOSINOPHIL NFR URNS MANUAL: SIGNIFICANT CHANGE UP
GLUCOSE BLDC GLUCOMTR-MCNC: 133 MG/DL — HIGH (ref 70–99)
GLUCOSE BLDC GLUCOMTR-MCNC: 154 MG/DL — HIGH (ref 70–99)
GLUCOSE BLDC GLUCOMTR-MCNC: 155 MG/DL — HIGH (ref 70–99)
GLUCOSE BLDC GLUCOMTR-MCNC: 448 MG/DL — HIGH (ref 70–99)
GLUCOSE BLDC GLUCOMTR-MCNC: 98 MG/DL — SIGNIFICANT CHANGE UP (ref 70–99)
GLUCOSE SERPL-MCNC: 156 MG/DL — HIGH (ref 70–115)
HCT VFR BLD CALC: 32 % — LOW (ref 37–47)
HGB BLD-MCNC: 10.1 G/DL — LOW (ref 12–16)
MCHC RBC-ENTMCNC: 27.5 PG — SIGNIFICANT CHANGE UP (ref 27–31)
MCHC RBC-ENTMCNC: 31.6 G/DL — LOW (ref 32–36)
MCV RBC AUTO: 87.2 FL — SIGNIFICANT CHANGE UP (ref 81–99)
PLATELET # BLD AUTO: 266 K/UL — SIGNIFICANT CHANGE UP (ref 150–400)
POTASSIUM SERPL-MCNC: 5.6 MMOL/L — HIGH (ref 3.5–5.3)
POTASSIUM SERPL-SCNC: 5.6 MMOL/L — HIGH (ref 3.5–5.3)
PROT SERPL-MCNC: 4.9 G/DL — LOW (ref 6.6–8.7)
RBC # BLD: 3.67 M/UL — LOW (ref 4.4–5.2)
RBC # FLD: 15.8 % — HIGH (ref 11–15.6)
SODIUM SERPL-SCNC: 138 MMOL/L — SIGNIFICANT CHANGE UP (ref 135–145)
WBC # BLD: 10.8 K/UL — SIGNIFICANT CHANGE UP (ref 4.8–10.8)
WBC # FLD AUTO: 10.8 K/UL — SIGNIFICANT CHANGE UP (ref 4.8–10.8)

## 2018-03-14 PROCEDURE — 99233 SBSQ HOSP IP/OBS HIGH 50: CPT

## 2018-03-14 RX ORDER — DIPHENHYDRAMINE HCL 50 MG
50 CAPSULE ORAL EVERY 4 HOURS
Qty: 0 | Refills: 0 | Status: DISCONTINUED | OUTPATIENT
Start: 2018-03-14 | End: 2018-03-19

## 2018-03-14 RX ORDER — SODIUM POLYSTYRENE SULFONATE 4.1 MEQ/G
15 POWDER, FOR SUSPENSION ORAL ONCE
Qty: 0 | Refills: 0 | Status: COMPLETED | OUTPATIENT
Start: 2018-03-14 | End: 2018-03-14

## 2018-03-14 RX ORDER — HYDROCORTISONE 1 %
1 OINTMENT (GRAM) TOPICAL DAILY
Qty: 0 | Refills: 0 | Status: DISCONTINUED | OUTPATIENT
Start: 2018-03-14 | End: 2018-03-19

## 2018-03-14 RX ORDER — SODIUM CHLORIDE 9 MG/ML
1000 INJECTION, SOLUTION INTRAVENOUS
Qty: 0 | Refills: 0 | Status: COMPLETED | OUTPATIENT
Start: 2018-03-14 | End: 2018-03-14

## 2018-03-14 RX ADMIN — Medication 100 MILLIGRAM(S): at 21:36

## 2018-03-14 RX ADMIN — Medication 200 MILLIGRAM(S): at 21:35

## 2018-03-14 RX ADMIN — Medication 1 APPLICATION(S): at 12:28

## 2018-03-14 RX ADMIN — MORPHINE SULFATE 15 MILLIGRAM(S): 50 CAPSULE, EXTENDED RELEASE ORAL at 12:23

## 2018-03-14 RX ADMIN — Medication 1: at 12:24

## 2018-03-14 RX ADMIN — SODIUM CHLORIDE 100 MILLILITER(S): 9 INJECTION, SOLUTION INTRAVENOUS at 21:35

## 2018-03-14 RX ADMIN — Medication 5 MILLIGRAM(S): at 18:13

## 2018-03-14 RX ADMIN — ALBUTEROL 2.5 MILLIGRAM(S): 90 AEROSOL, METERED ORAL at 03:01

## 2018-03-14 RX ADMIN — Medication 250 MILLIGRAM(S): at 18:13

## 2018-03-14 RX ADMIN — MORPHINE SULFATE 15 MILLIGRAM(S): 50 CAPSULE, EXTENDED RELEASE ORAL at 21:37

## 2018-03-14 RX ADMIN — ALBUTEROL 2.5 MILLIGRAM(S): 90 AEROSOL, METERED ORAL at 09:54

## 2018-03-14 RX ADMIN — ALBUTEROL 2.5 MILLIGRAM(S): 90 AEROSOL, METERED ORAL at 21:09

## 2018-03-14 RX ADMIN — Medication 100 MILLIGRAM(S): at 05:08

## 2018-03-14 RX ADMIN — SENNA PLUS 2 TABLET(S): 8.6 TABLET ORAL at 21:36

## 2018-03-14 RX ADMIN — MORPHINE SULFATE 15 MILLIGRAM(S): 50 CAPSULE, EXTENDED RELEASE ORAL at 22:30

## 2018-03-14 RX ADMIN — RIVAROXABAN 15 MILLIGRAM(S): KIT at 18:13

## 2018-03-14 RX ADMIN — Medication 300 MILLIGRAM(S): at 12:23

## 2018-03-14 RX ADMIN — Medication 250 MILLIGRAM(S): at 05:08

## 2018-03-14 RX ADMIN — MORPHINE SULFATE 15 MILLIGRAM(S): 50 CAPSULE, EXTENDED RELEASE ORAL at 00:41

## 2018-03-14 RX ADMIN — Medication 81 MILLIGRAM(S): at 12:23

## 2018-03-14 RX ADMIN — SODIUM POLYSTYRENE SULFONATE 15 GRAM(S): 4.1 POWDER, FOR SUSPENSION ORAL at 12:23

## 2018-03-14 RX ADMIN — FAMOTIDINE 20 MILLIGRAM(S): 10 INJECTION INTRAVENOUS at 12:23

## 2018-03-14 RX ADMIN — ALBUTEROL 2.5 MILLIGRAM(S): 90 AEROSOL, METERED ORAL at 16:14

## 2018-03-14 RX ADMIN — Medication 300 MILLIGRAM(S): at 05:09

## 2018-03-14 RX ADMIN — Medication 200 MILLIGRAM(S): at 12:25

## 2018-03-14 RX ADMIN — ATORVASTATIN CALCIUM 80 MILLIGRAM(S): 80 TABLET, FILM COATED ORAL at 21:35

## 2018-03-14 RX ADMIN — Medication 50 MILLIGRAM(S): at 12:25

## 2018-03-14 RX ADMIN — MAGNESIUM OXIDE 400 MG ORAL TABLET 400 MILLIGRAM(S): 241.3 TABLET ORAL at 12:22

## 2018-03-14 RX ADMIN — MORPHINE SULFATE 15 MILLIGRAM(S): 50 CAPSULE, EXTENDED RELEASE ORAL at 13:00

## 2018-03-14 RX ADMIN — Medication 5 MILLIGRAM(S): at 05:09

## 2018-03-14 RX ADMIN — Medication 300 MILLIGRAM(S): at 18:13

## 2018-03-14 RX ADMIN — Medication 12.5 MILLIGRAM(S): at 05:08

## 2018-03-14 RX ADMIN — Medication 100 MILLIGRAM(S): at 13:00

## 2018-03-14 NOTE — PROGRESS NOTE ADULT - PROBLEM SELECTOR PLAN 8
Consult nephrology for elevated creatinine. Possible allergy to kefzol?  Gentle hydration, avoid nephrotoxins, kayexalate for renal failure.

## 2018-03-14 NOTE — PROGRESS NOTE ADULT - SUBJECTIVE AND OBJECTIVE BOX
FLEX MORALES     Chief Complaint: Patient is a 82y old  Female who presents with a chief complaint of Trauma B s/p fall from bed on Xarelto (08 Mar 2018 09:00)      PAST MEDICAL & SURGICAL HISTORY:  Chronic anticoagulation  Chronic pain  Atrial fibrillation, unspecified type  CAD (coronary artery disease)  History of knee replacement, total, bilateral  History of hip replacement      HPI/OVERNIGHT EVENTS: Patient complaining of rash, renal input appreciated for renal failure, her creatinine is worse today.    MEDICATIONS  (STANDING):  ALBUTerol    0.083% 2.5 milliGRAM(s) Nebulizer every 6 hours  ALBUTerol    90 MICROgram(s) HFA Inhaler 1 Puff(s) Inhalation every 4 hours  aspirin enteric coated 81 milliGRAM(s) Oral daily  atorvastatin 80 milliGRAM(s) Oral at bedtime  clindamycin   Capsule 300 milliGRAM(s) Oral four times a day  dextrose 5%. 1000 milliLiter(s) (50 mL/Hr) IV Continuous <Continuous>  dextrose 50% Injectable 12.5 Gram(s) IV Push once  dextrose 50% Injectable 25 Gram(s) IV Push once  dextrose 50% Injectable 25 Gram(s) IV Push once  docusate sodium 100 milliGRAM(s) Oral three times a day  famotidine    Tablet 20 milliGRAM(s) Oral daily  hydrocortisone 1% Cream 1 Application(s) Topical daily  insulin lispro (HumaLOG) corrective regimen sliding scale   SubCutaneous three times a day before meals  insulin lispro (HumaLOG) corrective regimen sliding scale   SubCutaneous at bedtime  magnesium oxide 400 milliGRAM(s) Oral daily  metoprolol     tartrate 12.5 milliGRAM(s) Oral two times a day  morphine ER Tablet 15 milliGRAM(s) Oral every 12 hours  multiple electrolytes Injection Type 1 1000 milliLiter(s) (100 mL/Hr) IV Continuous <Continuous>  oxybutynin 5 milliGRAM(s) Oral two times a day  rivaroxaban 15 milliGRAM(s) Oral every 24 hours  saccharomyces boulardii 250 milliGRAM(s) Oral two times a day  senna 2 Tablet(s) Oral at bedtime  sodium polystyrene sulfonate Suspension 15 Gram(s) Oral once      Vital Signs Last 24 Hrs  T(C): 36.1 (13 Mar 2018 23:00), Max: 37 (13 Mar 2018 17:33)  T(F): 97 (13 Mar 2018 23:00), Max: 98.6 (13 Mar 2018 17:33)  HR: 69 (14 Mar 2018 05:08) (65 - 96)  BP: 118/66 (14 Mar 2018 05:08) (100/60 - 118/66)  BP(mean): --  RR: 18 (13 Mar 2018 23:00) (18 - 18)  SpO2: 96% (13 Mar 2018 23:00) (96% - 96%)    PHYSICAL EXAM:  Constitutional: NAD, well-groomed, well-developed  HEENT: PERRLA, EOMI, Normal Hearing, MMM  Neck: No LAD, No JVD  Back: Normal spine flexure, No CVA tenderness  Respiratory: CTAB Cardiovascular: S1 and S2, RRR, no M/G/R  Gastrointestinal: BS+, soft, NT/ND  Extremities:  chronic toe ulcer  Vascular: 2+ peripheral pulses  Neurological: A/O x 3, no focal deficits  Psychiatric: Normal mood, normal affect     CAPILLARY BLOOD GLUCOSE    LABS:                        10.1   10.8  )-----------( 266      ( 14 Mar 2018 06:50 )             32.0     03-14    138  |  102  |  79.0<H>  ----------------------------<  156<H>  5.6<H>   |  23.0  |  2.90<H>    Ca    8.1<L>      14 Mar 2018 06:50  Mg     1.8     03-13    TPro  4.9<L>  /  Alb  2.4<L>  /  TBili  0.4  /  DBili  x   /  AST  22  /  ALT  13  /  AlkPhos  42  03-14      Urinalysis Basic - ( 13 Mar 2018 23:36 )    Color: Yellow / Appearance: Clear / S.020 / pH: x  Gluc: x / Ketone: Negative  / Bili: Negative / Urobili: 1 mg/dL   Blood: x / Protein: 30 mg/dL / Nitrite: Negative   Leuk Esterase: Moderate / RBC: 3-5 /HPF / WBC 6-10   Sq Epi: x / Non Sq Epi: Few / Bacteria: Occasional        RADIOLOGY & ADDITIONAL TESTS:

## 2018-03-14 NOTE — PROGRESS NOTE ADULT - SUBJECTIVE AND OBJECTIVE BOX
NEPHROLOGY INTERVAL HPI/OVERNIGHT EVENTS: No new events.    MEDICATIONS  (STANDING):  ALBUTerol    0.083% 2.5 milliGRAM(s) Nebulizer every 6 hours  ALBUTerol    90 MICROgram(s) HFA Inhaler 1 Puff(s) Inhalation every 4 hours  aspirin enteric coated 81 milliGRAM(s) Oral daily  atorvastatin 80 milliGRAM(s) Oral at bedtime  clindamycin   Capsule 300 milliGRAM(s) Oral four times a day  dextrose 5%. 1000 milliLiter(s) (50 mL/Hr) IV Continuous <Continuous>  dextrose 50% Injectable 12.5 Gram(s) IV Push once  dextrose 50% Injectable 25 Gram(s) IV Push once  dextrose 50% Injectable 25 Gram(s) IV Push once  docusate sodium 100 milliGRAM(s) Oral three times a day  famotidine    Tablet 20 milliGRAM(s) Oral daily  insulin lispro (HumaLOG) corrective regimen sliding scale   SubCutaneous three times a day before meals  insulin lispro (HumaLOG) corrective regimen sliding scale   SubCutaneous at bedtime  magnesium oxide 400 milliGRAM(s) Oral daily  metoprolol     tartrate 12.5 milliGRAM(s) Oral two times a day  morphine ER Tablet 15 milliGRAM(s) Oral every 12 hours  oxybutynin 5 milliGRAM(s) Oral two times a day  rivaroxaban 15 milliGRAM(s) Oral every 24 hours  saccharomyces boulardii 250 milliGRAM(s) Oral two times a day  senna 2 Tablet(s) Oral at bedtime  sodium chloride 0.9%. 1000 milliLiter(s) (75 mL/Hr) IV Continuous <Continuous>    MEDICATIONS  (PRN):  bisacodyl Suppository 10 milliGRAM(s) Rectal daily PRN Constipation  cyclobenzaprine 10 milliGRAM(s) Oral at bedtime PRN Muscle Spasm  dextrose Gel 1 Dose(s) Oral once PRN Blood Glucose LESS THAN 70 milliGRAM(s)/deciliter  glucagon  Injectable 1 milliGRAM(s) IntraMuscular once PRN Glucose LESS THAN 70 milligrams/deciliter  guaiFENesin    Syrup 200 milliGRAM(s) Oral every 6 hours PRN Cough      Allergies    Cipro (Rash)  latex (Rash)          Vital Signs Last 24 Hrs  T(C): 36.1 (13 Mar 2018 23:00), Max: 37 (13 Mar 2018 17:33)  T(F): 97 (13 Mar 2018 23:00), Max: 98.6 (13 Mar 2018 17:33)  HR: 69 (14 Mar 2018 05:08) (65 - 96)  BP: 118/66 (14 Mar 2018 05:08) (100/60 - 118/66)  BP(mean): --  RR: 18 (13 Mar 2018 23:00) (18 - 18)  SpO2: 96% (13 Mar 2018 23:00) (96% - 96%)  Daily     Daily     PHYSICAL EXAM:    GENERAL: appears acutely ill  HEAD:  ecchymosis right orbit area  EYES: wnl  ENMT:   NECK: veins wnl  NERVOUS SYSTEM:  alert, oriented  CHEST/LUNG: decreased  bs  bases  HEART: soft systolic  murmur, no  rub  ABDOMEN: obese, soft, not tender  EXTREMITIES: edema with erythema  both lower legs   LYMPH: wnl  SKIN: rash better   neg    LABS:                        10.1   10.8  )-----------( 266      ( 14 Mar 2018 06:50 )             32.0     03-14    138  |  102  |  79.0<H>  ----------------------------<  156<H>  5.6<H>   |  23.0  |  2.90<H>    Ca    8.1<L>      14 Mar 2018 06:50  Phos  4.4     03-12  Mg     1.8     03-13    TPro  4.9<L>  /  Alb  2.4<L>  /  TBili  0.4  /  DBili  x   /  AST  22  /  ALT  13  /  AlkPhos  42  03-14      Urinalysis Basic - ( 13 Mar 2018 23:36 )    Color: Yellow / Appearance: Clear / S.020 / pH: x  Gluc: x / Ketone: Negative  / Bili: Negative / Urobili: 1 mg/dL   Blood: x / Protein: 30 mg/dL / Nitrite: Negative   Leuk Esterase: Moderate / RBC: 3-5 /HPF / WBC 6-10   Sq Epi: x / Non Sq Epi: Few / Bacteria: Occasional              RADIOLOGY & ADDITIONAL TESTS:

## 2018-03-14 NOTE — PROGRESS NOTE ADULT - PROBLEM SELECTOR PLAN 5
Continue Statin and discontinue fenofibrate as triglycerides are normal.  Repeat lipid panel as an outpatient.

## 2018-03-14 NOTE — PROGRESS NOTE ADULT - ASSESSMENT
82 year old female with PMH HTN, DMT2, Dyslipidemia, CAD s/p PCI, Chronic pain presents to [facility] after falling at home, after her legs gave way upon arising from bed. Examination significant for Right periorbital ecchymosis. UA significant for infection, otherwise investigations unremarkable except for right periorbital hematoma on CTH. await urine culture. Mild dehydration on renal indices on admission. On 3/12 her lower extremity are red hot swollen, treatment initiated for cellulitis. On 3/13 Antibiotics were changed to clindamycin secondary to rash from kefzol and Nephrology consulted for elevated creatinine. Her rash continues to bother her and her creatinine continues to climb.

## 2018-03-14 NOTE — DIETITIAN INITIAL EVALUATION ADULT. - OTHER INFO
Pt seen at bedside. Pt currently on DASH/TLC, Consistent CHO (Evening Snacks) diet with no concentrated potassium. Pt states having good appetite and PO intake currently and PTA. Pt denies n/v/c/d. Denies difficulty chewing/swallowing. No significant weight change. Pt states having good control of diabetes with current HbA1C: 7.4% Pt seen at bedside. Pt currently on DASH/TLC, Consistent CHO (Evening Snacks) diet with no concentrated potassium. Pt states having good appetite and PO intake currently and PTA. Pt denies n/v/c/d. Denies difficulty chewing/swallowing. No significant weight change. Pt states having good control of diabetes with current HbA1C: 7.4%.

## 2018-03-14 NOTE — DIETITIAN INITIAL EVALUATION ADULT. - DIET TYPE
DASH/TLC (sodium and cholesterol restricted diet)/consistent carbohydrate (evening snack)/no concentrated potassium

## 2018-03-15 ENCOUNTER — TRANSCRIPTION ENCOUNTER (OUTPATIENT)
Age: 83
End: 2018-03-15

## 2018-03-15 LAB
ANION GAP SERPL CALC-SCNC: 14 MMOL/L — SIGNIFICANT CHANGE UP (ref 5–17)
BUN SERPL-MCNC: 88 MG/DL — HIGH (ref 8–20)
CALCIUM SERPL-MCNC: 8.2 MG/DL — LOW (ref 8.6–10.2)
CHLORIDE SERPL-SCNC: 100 MMOL/L — SIGNIFICANT CHANGE UP (ref 98–107)
CK MB CFR SERPL CALC: 6.7 NG/ML — SIGNIFICANT CHANGE UP (ref 0–6.7)
CK SERPL-CCNC: 392 U/L — HIGH (ref 25–170)
CO2 SERPL-SCNC: 23 MMOL/L — SIGNIFICANT CHANGE UP (ref 22–29)
CREAT SERPL-MCNC: 2.71 MG/DL — HIGH (ref 0.5–1.3)
EOSINOPHIL NFR URNS MANUAL: SIGNIFICANT CHANGE UP
GLUCOSE BLDC GLUCOMTR-MCNC: 138 MG/DL — HIGH (ref 70–99)
GLUCOSE BLDC GLUCOMTR-MCNC: 154 MG/DL — HIGH (ref 70–99)
GLUCOSE BLDC GLUCOMTR-MCNC: 167 MG/DL — HIGH (ref 70–99)
GLUCOSE BLDC GLUCOMTR-MCNC: 89 MG/DL — SIGNIFICANT CHANGE UP (ref 70–99)
GLUCOSE SERPL-MCNC: 123 MG/DL — HIGH (ref 70–115)
HCT VFR BLD CALC: 33.1 % — LOW (ref 37–47)
HGB BLD-MCNC: 10.6 G/DL — LOW (ref 12–16)
MAGNESIUM SERPL-MCNC: 2 MG/DL — SIGNIFICANT CHANGE UP (ref 1.6–2.6)
MCHC RBC-ENTMCNC: 28.5 PG — SIGNIFICANT CHANGE UP (ref 27–31)
MCHC RBC-ENTMCNC: 32 G/DL — SIGNIFICANT CHANGE UP (ref 32–36)
MCV RBC AUTO: 89 FL — SIGNIFICANT CHANGE UP (ref 81–99)
OSMOLALITY UR: 439 MOSM/KG — SIGNIFICANT CHANGE UP (ref 300–1000)
PHOSPHATE SERPL-MCNC: 4.8 MG/DL — HIGH (ref 2.4–4.7)
PLATELET # BLD AUTO: 260 K/UL — SIGNIFICANT CHANGE UP (ref 150–400)
POTASSIUM SERPL-MCNC: 6.2 MMOL/L — CRITICAL HIGH (ref 3.5–5.3)
POTASSIUM SERPL-SCNC: 6.2 MMOL/L — CRITICAL HIGH (ref 3.5–5.3)
POTASSIUM UR-SCNC: 53 MMOL/L — SIGNIFICANT CHANGE UP
RBC # BLD: 3.72 M/UL — LOW (ref 4.4–5.2)
RBC # FLD: 16.1 % — HIGH (ref 11–15.6)
SODIUM SERPL-SCNC: 137 MMOL/L — SIGNIFICANT CHANGE UP (ref 135–145)
SODIUM UR-SCNC: 42 MMOL/L — SIGNIFICANT CHANGE UP
WBC # BLD: 10.5 K/UL — SIGNIFICANT CHANGE UP (ref 4.8–10.8)
WBC # FLD AUTO: 10.5 K/UL — SIGNIFICANT CHANGE UP (ref 4.8–10.8)

## 2018-03-15 PROCEDURE — 99233 SBSQ HOSP IP/OBS HIGH 50: CPT

## 2018-03-15 PROCEDURE — 73630 X-RAY EXAM OF FOOT: CPT | Mod: 26,LT

## 2018-03-15 RX ORDER — RANITIDINE HYDROCHLORIDE 150 MG/1
0 TABLET, FILM COATED ORAL
Qty: 0 | Refills: 0 | COMMUNITY

## 2018-03-15 RX ORDER — ASPIRIN/CALCIUM CARB/MAGNESIUM 324 MG
1 TABLET ORAL
Qty: 0 | Refills: 0 | COMMUNITY

## 2018-03-15 RX ORDER — ATORVASTATIN CALCIUM 80 MG/1
1 TABLET, FILM COATED ORAL
Qty: 0 | Refills: 0 | COMMUNITY
Start: 2018-03-15

## 2018-03-15 RX ORDER — SENNA PLUS 8.6 MG/1
2 TABLET ORAL
Qty: 0 | Refills: 0 | COMMUNITY
Start: 2018-03-15

## 2018-03-15 RX ORDER — MORPHINE SULFATE 50 MG/1
1 CAPSULE, EXTENDED RELEASE ORAL
Qty: 0 | Refills: 0 | COMMUNITY

## 2018-03-15 RX ORDER — DOCUSATE SODIUM 100 MG
1 CAPSULE ORAL
Qty: 0 | Refills: 0 | COMMUNITY
Start: 2018-03-15

## 2018-03-15 RX ORDER — SODIUM POLYSTYRENE SULFONATE 4.1 MEQ/G
15 POWDER, FOR SUSPENSION ORAL ONCE
Qty: 0 | Refills: 0 | Status: COMPLETED | OUTPATIENT
Start: 2018-03-15 | End: 2018-03-15

## 2018-03-15 RX ORDER — RAMIPRIL 5 MG
1 CAPSULE ORAL
Qty: 0 | Refills: 0 | COMMUNITY

## 2018-03-15 RX ORDER — MELOXICAM 15 MG/1
1 TABLET ORAL
Qty: 0 | Refills: 0 | COMMUNITY

## 2018-03-15 RX ORDER — FENOFIBRATE,MICRONIZED 130 MG
1 CAPSULE ORAL
Qty: 0 | Refills: 0 | COMMUNITY

## 2018-03-15 RX ORDER — POLYETHYLENE GLYCOL 3350 17 G/17G
17 POWDER, FOR SOLUTION ORAL DAILY
Qty: 0 | Refills: 0 | Status: DISCONTINUED | OUTPATIENT
Start: 2018-03-15 | End: 2018-03-19

## 2018-03-15 RX ORDER — ASPIRIN/CALCIUM CARB/MAGNESIUM 324 MG
1 TABLET ORAL
Qty: 0 | Refills: 0 | COMMUNITY
Start: 2018-03-15

## 2018-03-15 RX ORDER — CYCLOBENZAPRINE HYDROCHLORIDE 10 MG/1
0 TABLET, FILM COATED ORAL
Qty: 0 | Refills: 0 | COMMUNITY

## 2018-03-15 RX ORDER — ALBUTEROL 90 UG/1
1 AEROSOL, METERED ORAL
Qty: 0 | Refills: 0 | COMMUNITY
Start: 2018-03-15

## 2018-03-15 RX ORDER — FLUDROCORTISONE ACETATE 0.1 MG/1
0.1 TABLET ORAL
Qty: 0 | Refills: 0 | Status: DISCONTINUED | OUTPATIENT
Start: 2018-03-15 | End: 2018-03-19

## 2018-03-15 RX ORDER — CYCLOBENZAPRINE HYDROCHLORIDE 10 MG/1
1 TABLET, FILM COATED ORAL
Qty: 0 | Refills: 0 | COMMUNITY
Start: 2018-03-15

## 2018-03-15 RX ORDER — BUMETANIDE 0.25 MG/ML
0 INJECTION INTRAMUSCULAR; INTRAVENOUS
Qty: 0 | Refills: 0 | COMMUNITY

## 2018-03-15 RX ORDER — POTASSIUM CHLORIDE 20 MEQ
0 PACKET (EA) ORAL
Qty: 0 | Refills: 0 | COMMUNITY

## 2018-03-15 RX ORDER — SODIUM POLYSTYRENE SULFONATE 4.1 MEQ/G
1 POWDER, FOR SUSPENSION ORAL
Qty: 0 | Refills: 0 | COMMUNITY
Start: 2018-03-15

## 2018-03-15 RX ORDER — SACCHAROMYCES BOULARDII 250 MG
1 POWDER IN PACKET (EA) ORAL
Qty: 0 | Refills: 0 | COMMUNITY
Start: 2018-03-15 | End: 2018-03-17

## 2018-03-15 RX ORDER — POLYETHYLENE GLYCOL 3350 17 G/17G
17 POWDER, FOR SOLUTION ORAL
Qty: 0 | Refills: 0 | COMMUNITY
Start: 2018-03-15

## 2018-03-15 RX ORDER — FLUDROCORTISONE ACETATE 0.1 MG/1
1 TABLET ORAL
Qty: 0 | Refills: 0 | COMMUNITY
Start: 2018-03-15

## 2018-03-15 RX ORDER — SITAGLIPTIN 50 MG/1
1 TABLET, FILM COATED ORAL
Qty: 0 | Refills: 0 | COMMUNITY

## 2018-03-15 RX ADMIN — Medication 300 MILLIGRAM(S): at 00:24

## 2018-03-15 RX ADMIN — Medication 1: at 17:06

## 2018-03-15 RX ADMIN — Medication 5 MILLIGRAM(S): at 17:06

## 2018-03-15 RX ADMIN — Medication 100 MILLIGRAM(S): at 05:57

## 2018-03-15 RX ADMIN — Medication 250 MILLIGRAM(S): at 17:06

## 2018-03-15 RX ADMIN — Medication 81 MILLIGRAM(S): at 13:03

## 2018-03-15 RX ADMIN — ALBUTEROL 2.5 MILLIGRAM(S): 90 AEROSOL, METERED ORAL at 03:34

## 2018-03-15 RX ADMIN — RIVAROXABAN 15 MILLIGRAM(S): KIT at 17:06

## 2018-03-15 RX ADMIN — MORPHINE SULFATE 15 MILLIGRAM(S): 50 CAPSULE, EXTENDED RELEASE ORAL at 14:00

## 2018-03-15 RX ADMIN — ATORVASTATIN CALCIUM 80 MILLIGRAM(S): 80 TABLET, FILM COATED ORAL at 22:04

## 2018-03-15 RX ADMIN — SODIUM POLYSTYRENE SULFONATE 15 GRAM(S): 4.1 POWDER, FOR SUSPENSION ORAL at 17:59

## 2018-03-15 RX ADMIN — ALBUTEROL 2.5 MILLIGRAM(S): 90 AEROSOL, METERED ORAL at 20:50

## 2018-03-15 RX ADMIN — FAMOTIDINE 20 MILLIGRAM(S): 10 INJECTION INTRAVENOUS at 13:02

## 2018-03-15 RX ADMIN — MORPHINE SULFATE 15 MILLIGRAM(S): 50 CAPSULE, EXTENDED RELEASE ORAL at 13:02

## 2018-03-15 RX ADMIN — SODIUM POLYSTYRENE SULFONATE 15 GRAM(S): 4.1 POWDER, FOR SUSPENSION ORAL at 11:48

## 2018-03-15 RX ADMIN — Medication 1: at 11:48

## 2018-03-15 RX ADMIN — MAGNESIUM OXIDE 400 MG ORAL TABLET 400 MILLIGRAM(S): 241.3 TABLET ORAL at 13:03

## 2018-03-15 RX ADMIN — Medication 300 MILLIGRAM(S): at 17:06

## 2018-03-15 RX ADMIN — ALBUTEROL 2.5 MILLIGRAM(S): 90 AEROSOL, METERED ORAL at 15:18

## 2018-03-15 RX ADMIN — Medication 250 MILLIGRAM(S): at 05:57

## 2018-03-15 RX ADMIN — Medication 300 MILLIGRAM(S): at 05:58

## 2018-03-15 RX ADMIN — Medication 300 MILLIGRAM(S): at 13:02

## 2018-03-15 RX ADMIN — Medication 1 APPLICATION(S): at 13:07

## 2018-03-15 RX ADMIN — Medication 12.5 MILLIGRAM(S): at 17:07

## 2018-03-15 RX ADMIN — ALBUTEROL 2.5 MILLIGRAM(S): 90 AEROSOL, METERED ORAL at 09:21

## 2018-03-15 RX ADMIN — Medication 5 MILLIGRAM(S): at 05:57

## 2018-03-15 RX ADMIN — FLUDROCORTISONE ACETATE 0.1 MILLIGRAM(S): 0.1 TABLET ORAL at 17:06

## 2018-03-15 RX ADMIN — Medication 12.5 MILLIGRAM(S): at 05:57

## 2018-03-15 RX ADMIN — Medication 200 MILLIGRAM(S): at 17:59

## 2018-03-15 RX ADMIN — Medication 100 MILLIGRAM(S): at 13:03

## 2018-03-15 NOTE — PATIENT PROFILE ADULT. - HARM RISK FACTORS
Mom Savi states, \"my son just got over the strep and flu but yesterday he started with runny nose which is really yellow and thick and he has a sore under his nose. \" yes

## 2018-03-15 NOTE — DISCHARGE NOTE ADULT - MEDICATION SUMMARY - MEDICATIONS TO STOP TAKING
I will STOP taking the medications listed below when I get home from the hospital:    fenofibrate 160 mg oral tablet  -- 1 tab(s) by mouth once a day    meloxicam 15 mg oral tablet  -- 1 tab(s) by mouth once a day    Klor-Con 20 mEq oral powder for reconstitution  -- orally once a day

## 2018-03-15 NOTE — DISCHARGE NOTE ADULT - MEDICATION SUMMARY - MEDICATIONS TO TAKE
I will START or STAY ON the medications listed below when I get home from the hospital:    aspirin 81 mg oral delayed release tablet  -- 1 tab(s) by mouth once a day  -- Indication: For CAD    morphine 15 mg/12 hr oral tablet, extended release  -- 1 tab(s) by mouth every 12 hours, As needed, Moderate to sever pain  -- Indication: For Pain     ramipril 2.5 mg oral capsule  -- 1 cap(s) by mouth once a day  -- Indication: For CFH    Xarelto 15 mg oral tablet  -- 1 tab(s) by mouth once a day (in the evening)  -- Indication: For Afib    SITagliptin 50 mg oral tablet  -- 1 tab(s) by mouth once a day  -- Indication: For DM    diphenhydrAMINE 50 mg oral capsule  -- 1 cap(s) by mouth every 4 hours, As needed, Rash and/or Itching  -- Indication: For Allergies     rosuvastatin 20 mg oral tablet  -- 1 tab(s) by mouth once a day (at bedtime)  -- Indication: For HLD    Metoprolol Tartrate 25 mg oral tablet  -- 0.5 tab(s) by mouth 2 times a day  -- Indication: For Afib    ProAir HFA 90 mcg/inh inhalation aerosol  -- 1 puff(s) inhaled every 4 hours  -- Indication: For SOB     sodium polystyrene sulfonate  -- 1 dose(s) by mouth once a day  -- Indication: For Constipation     bumetanide 0.5 mg oral tablet  -- 1 tab(s) by mouth every other day   -- Indication: For ChF    raNITIdine 150 mg oral capsule  -- 1 cap(s) by mouth once a day   -- Indication: For GERD    senna oral tablet  -- 2 tab(s) by mouth once a day (at bedtime)  -- Indication: For Constipation    docusate sodium 100 mg oral capsule  -- 1 cap(s) by mouth 3 times a day  -- Indication: For Constipation    polyethylene glycol 3350 oral powder for reconstitution  -- 17 gram(s) by mouth once a day  -- Indication: For Constipation    cyclobenzaprine 10 mg oral tablet  -- 1 tab(s) by mouth once a day (at bedtime), As needed, Muscle Spasm  -- Indication: For Pain     saccharomyces boulardii lyo 250 mg oral capsule  -- 1 cap(s) by mouth 2 times a day  -- Indication: For Prophyalxis     oxybutynin 15 mg/24 hr oral tablet, extended release  -- 1 tab(s) by mouth once a day  -- Indication: For Bladder dysfuntion

## 2018-03-15 NOTE — CONSULT NOTE ADULT - ASSESSMENT
A: left hallux ulceration    P:  Patient evaluated and chart reviewed  Wound appears stable and uninfected  Will monitor wound  xrays ordered to r/o OM  Patient scheduled for f/u with Dr. Lock in two weeks  Podiatry will follow and monitor while in house
PRUDENCE   No Radiocontrast   R/O AIN from Ancef ( now held )   R/O  ATN from Hypotension   R/O Obstruction     Rec  D/C Bumex and Lisinopril   NS @ 75 ml / hr  strict I & O   Urine studies and EOS  CPK   Renal sono   Bladder scan   Labs in am     Will follow
82 year old female with PMH HTN, DMT2, Dyslipidemia, CAD s/p PCI, Chronic pain presents to [facility] after falling at home, after her legs gave way upon arising from bed.  Examination significant for Right periorbital ecchymosis. Afebilre, VSS.  UA significant for infection, otherwise investigations unremarkable except for right periorbital hematoma on CTH.

## 2018-03-15 NOTE — DISCHARGE NOTE ADULT - SECONDARY DIAGNOSIS.
Acute cystitis without hematuria Acute renal failure, unspecified acute renal failure type Atrial fibrillation, unspecified type Cellulitis of lower extremity, unspecified laterality Chronic diastolic (congestive) heart failure Type 2 diabetes mellitus with other neurologic complication, without long-term current use of insulin

## 2018-03-15 NOTE — PROGRESS NOTE ADULT - SUBJECTIVE AND OBJECTIVE BOX
FLEX MORALES     Chief Complaint: Patient is a 82y old  Female who presents with a chief complaint of Trauma B s/p fall from bed on Xarelto (15 Mar 2018 09:00)      PAST MEDICAL & SURGICAL HISTORY:  Chronic anticoagulation  Chronic pain  Atrial fibrillation, unspecified type  CAD (coronary artery disease)  History of knee replacement, total, bilateral  History of hip replacement      HPI/OVERNIGHT EVENTS: Patient admitted for trip and fall. She was given kefzol for cellulitis and developed an adverse reaction including rash and acute renal failure. On 3/15 renal function beginning to stabilize. Potassium remains elevated.    MEDICATIONS  (STANDING):  ALBUTerol    0.083% 2.5 milliGRAM(s) Nebulizer every 6 hours  ALBUTerol    90 MICROgram(s) HFA Inhaler 1 Puff(s) Inhalation every 4 hours  aspirin enteric coated 81 milliGRAM(s) Oral daily  atorvastatin 80 milliGRAM(s) Oral at bedtime  clindamycin   Capsule 300 milliGRAM(s) Oral four times a day  dextrose 5%. 1000 milliLiter(s) (50 mL/Hr) IV Continuous <Continuous>  dextrose 50% Injectable 12.5 Gram(s) IV Push once  dextrose 50% Injectable 25 Gram(s) IV Push once  dextrose 50% Injectable 25 Gram(s) IV Push once  docusate sodium 100 milliGRAM(s) Oral three times a day  famotidine    Tablet 20 milliGRAM(s) Oral daily  fludroCORTISONE 0.1 milliGRAM(s) Oral two times a day  hydrocortisone 1% Cream 1 Application(s) Topical daily  insulin lispro (HumaLOG) corrective regimen sliding scale   SubCutaneous three times a day before meals  insulin lispro (HumaLOG) corrective regimen sliding scale   SubCutaneous at bedtime  magnesium oxide 400 milliGRAM(s) Oral daily  metoprolol     tartrate 12.5 milliGRAM(s) Oral two times a day  morphine ER Tablet 15 milliGRAM(s) Oral every 12 hours  oxybutynin 5 milliGRAM(s) Oral two times a day  polyethylene glycol 3350 17 Gram(s) Oral daily  rivaroxaban 15 milliGRAM(s) Oral every 24 hours  saccharomyces boulardii 250 milliGRAM(s) Oral two times a day  senna 2 Tablet(s) Oral at bedtime  sodium polystyrene sulfonate Suspension 15 Gram(s) Oral once      Vital Signs Last 24 Hrs  T(C): 37.1 (15 Mar 2018 08:57), Max: 37.1 (15 Mar 2018 08:57)  T(F): 98.8 (15 Mar 2018 08:57), Max: 98.8 (15 Mar 2018 08:57)  HR: 98 (15 Mar 2018 08:57) (68 - 98)  BP: 79/51 (15 Mar 2018 08:57) (79/51 - 108/62)  BP(mean): --  RR: 18 (15 Mar 2018 08:57) (17 - 18)  SpO2: 98% (15 Mar 2018 08:57) (98% - 99%)    PHYSICAL EXAM:  Constitutional: NAD, well-groomed, well-developed  HEENT:  facial bruising  Neck: No LAD, No JVD  Back: Normal spine flexure, No CVA tenderness  Respiratory: CTAB Cardiovascular: S1 and S2, RRR, no M/G/R  Gastrointestinal: obese  Extremities: two plus edema  Vascular: 2+ peripheral pulses  Neurological: A/O x 3, no focal deficits  Psychiatric: Normal mood, normal affect  Musculoskeletal: 5/5 strength b/l upper and lower extremities  Skin: No rashes    CAPILLARY BLOOD GLUCOSE    LABS:                        10.6   10.5  )-----------( 260      ( 15 Mar 2018 08:14 )             33.1     03-15    137  |  100  |  88.0<H>  ----------------------------<  123<H>  6.2<HH>   |  23.0  |  2.71<H>    Ca    8.2<L>      15 Mar 2018 08:14  Phos  4.8     03-15  Mg     2.0     03-15    TPro  4.9<L>  /  Alb  2.4<L>  /  TBili  0.4  /  DBili  x   /  AST  22  /  ALT  13  /  AlkPhos  42  03-14      Urinalysis Basic - ( 13 Mar 2018 23:36 )    Color: Yellow / Appearance: Clear / S.020 / pH: x  Gluc: x / Ketone: Negative  / Bili: Negative / Urobili: 1 mg/dL   Blood: x / Protein: 30 mg/dL / Nitrite: Negative   Leuk Esterase: Moderate / RBC: 3-5 /HPF / WBC 6-10   Sq Epi: x / Non Sq Epi: Few / Bacteria: Occasional        RADIOLOGY & ADDITIONAL TESTS:

## 2018-03-15 NOTE — DISCHARGE NOTE ADULT - CARE PLAN
Principal Discharge DX:	Fall, initial encounter  Goal:	Prevent falls  Assessment and plan of treatment:	Transfer to [facility] when stable  Secondary Diagnosis:	Acute cystitis without hematuria  Goal:	Patient completed course of antibiotics  Secondary Diagnosis:	Acute renal failure, unspecified acute renal failure type  Goal:	Baseline creatinine  Secondary Diagnosis:	Atrial fibrillation, unspecified type  Secondary Diagnosis:	Cellulitis of lower extremity, unspecified laterality  Secondary Diagnosis:	Chronic diastolic (congestive) heart failure  Secondary Diagnosis:	Type 2 diabetes mellitus with other neurologic complication, without long-term current use of insulin Principal Discharge DX:	Fall, initial encounter  Goal:	Prevent falls  Assessment and plan of treatment:	Transfer to [facility] when stable  Secondary Diagnosis:	Acute cystitis without hematuria  Goal:	Patient completed course of antibiotics  Secondary Diagnosis:	Acute renal failure, unspecified acute renal failure type  Goal:	Baseline creatinine 0.7, has peaked at 2.9 now trending down  Secondary Diagnosis:	Atrial fibrillation, unspecified type  Goal:	Continue xartelto  Secondary Diagnosis:	Cellulitis of lower extremity, unspecified laterality  Goal:	Clindamycin thru 3/17  Secondary Diagnosis:	Chronic diastolic (congestive) heart failure  Goal:	Continue cardiac medications  Secondary Diagnosis:	Type 2 diabetes mellitus with other neurologic complication, without long-term current use of insulin  Goal:	Monitor glucose levels Principal Discharge DX:	Fall, initial encounter  Goal:	Prevent falls  Assessment and plan of treatment:	Rehab  Secondary Diagnosis:	Acute cystitis without hematuria  Goal:	Patient completed course of antibiotics  Secondary Diagnosis:	Acute renal failure, unspecified acute renal failure type  Goal:	Resolved.  Secondary Diagnosis:	Atrial fibrillation, unspecified type  Goal:	Continue xarelto.  Secondary Diagnosis:	Cellulitis of lower extremity, unspecified laterality  Goal:	completed the course  Assessment and plan of treatment:	keep legs elevated while sitting or laying.  Secondary Diagnosis:	Chronic diastolic (congestive) heart failure  Goal:	Continue cardiac medications  Assessment and plan of treatment:	Patient diuretics has been resumed, need to get BMP in 1 week.  If BP is low can hold ramipril.  Secondary Diagnosis:	Type 2 diabetes mellitus with other neurologic complication, without long-term current use of insulin  Goal:	Continue with home meds

## 2018-03-15 NOTE — PROGRESS NOTE ADULT - PROBLEM SELECTOR PLAN 8
Nephrology input appreciated. Possible allergy to kefzol?  Gentle hydration, avoid nephrotoxins, kayexalate for renal failure. Add florinef

## 2018-03-15 NOTE — CONSULT NOTE ADULT - SUBJECTIVE AND OBJECTIVE BOX
Patient is a 82y old  Female seen and examined at bedside for left hallux ulcer. Patient states wound has been present for a few weeks. Sees podiatrist Dr. Lock outpatient. States wound is painful. Denies noticing drainage. Denies trauma to toe during fall. States she previously has leg ulcers which have closed. No other pedal complaints at this time.       HPI:  Patient is a 81 yo F who was BIBEMS code Trauma B s/p fall from standing on Xarelto. Patient has a hx of R knee repair and reports her knee gave out when she was getting out of bed. She states she hit her right eye on a chair while she was falling. Pt remembers everything, denies LOC. On arrival to trauma bay, patient AAOx3, in NAD, with a GCS of 15. Has periorbital ecchymosis and some dried blood in her oropharynx. Has some pain in her R knee but denies any other complaints. CXR negative. Sent for CT of head and c-spine. (08 Mar 2018 09:00)      PMH:Chronic anticoagulation  Chronic pain  Atrial fibrillation, unspecified type  CAD (coronary artery disease)    Allergies: Cipro (Rash)  latex (Rash)      FH:  PSX: History of knee replacement, total, bilateral  History of hip replacement      Vital Signs Last 24 Hrs  T(C): 37.1 (15 Mar 2018 08:57), Max: 37.1 (15 Mar 2018 08:57)  T(F): 98.8 (15 Mar 2018 08:57), Max: 98.8 (15 Mar 2018 08:57)  HR: 98 (15 Mar 2018 08:57) (68 - 98)  BP: 79/51 (15 Mar 2018 08:57) (79/51 - 108/62)  BP(mean): --  RR: 18 (15 Mar 2018 08:57) (17 - 18)  SpO2: 98% (15 Mar 2018 08:57) (98% - 99%)    LABS                        10.6   10.5  )-----------( 260      ( 15 Mar 2018 08:14 )             33.1               03-15    137  |  100  |  88.0<H>  ----------------------------<  123<H>  6.2<HH>   |  23.0  |  2.71<H>    Ca    8.2<L>      15 Mar 2018 08:14  Phos  4.8     03-15  Mg     2.0     03-15    TPro  4.9<L>  /  Alb  2.4<L>  /  TBili  0.4  /  DBili  x   /  AST  22  /  ALT  13  /  AlkPhos  42  03-14      ROS  REVIEW OF SYSTEMS:    CONSTITUTIONAL: No fever, weight loss, or fatigue  EYES: No eye pain, visual disturbances, or discharge  ENMT:  No difficulty hearing, tinnitus, vertigo; No sinus or throat pain  NECK: No pain or stiffness  BREASTS: No pain, masses, or nipple discharge  RESPIRATORY: No cough, wheezing, chills or hemoptysis; No shortness of breath  CARDIOVASCULAR: No chest pain, palpitations, dizziness, or leg swelling  GASTROINTESTINAL: No abdominal or epigastric pain. No nausea, vomiting, or hematemesis; No diarrhea or constipation. No melena or hematochezia.  GENITOURINARY: No dysuria, frequency, hematuria, or incontinence  NEUROLOGICAL: No headaches, memory loss, loss of strength, numbness, or tremors  SKIN: No itching, burning, rashes, left hallux ulcer, painful to palpation  LYMPH NODES: No enlarged glands  ENDOCRINE: No heat or cold intolerance; No hair loss  MUSCULOSKELETAL: No joint pain or swelling; No muscle, back, or extremity pain  PSYCHIATRIC: No depression, anxiety, mood swings, or difficulty sleeping  HEME/LYMPH: No easy bruising, or bleeding gums  ALLERY AND IMMUNOLOGIC: No hives or eczema      PHYSICAL EXAM  GEN: FLEX MORALES is a pleasant well-nourished, well developed 82y Female in no acute distress, alert awake, and oriented to person, place and time.   LE Focused:    Vasc:  pedal pulses nonpalpable; TG wnl; CFT < 3 sec to all digits  Derm: left distal hallux superficial ulcer with dry sanguinous drainage, hyperkeratotic base, no underlying deep ulceration, no probing or tunneling, no purulence or drainage; hyperpigmented venous stasis changes to B/L LE, no open lesions to legs   Neuro: protective sensation intact at level of digits    Imaging: pending  Cultures: Culture Results:   No growth (03-10 @ 14:31)

## 2018-03-15 NOTE — PROVIDER CONTACT NOTE (CRITICAL VALUE NOTIFICATION) - ACTION/TREATMENT ORDERED:
Dr. Persaud aware.  No new orders at this time.  Will continue to monitor
MD will check and possibly redraw.

## 2018-03-15 NOTE — DISCHARGE NOTE ADULT - PATIENT PORTAL LINK FT
You can access the [facility] Patient Portal, offered by [facility], by registering with the following website: [email or web address]

## 2018-03-15 NOTE — DISCHARGE NOTE ADULT - CARE PROVIDER_API CALL
Brandon Bryan), Internal Medicine; Nephrology  [address] [place] [id number]  Phone: [phone]  Fax: [phone] Brandon Bryan), Internal Medicine; Nephrology  [address] [facility] [address]  [place], [place] [id number]  Phone: [phone]  Fax: [phone]    Carole Schilling), Akira and Iman [facility]  [address] [place] [address]  Phone: [phone]  Fax: [phone] Carole Schilling), Esha Bryanna [facility]  [address] [place] [address]  Phone: [phone]  Fax: [phone]

## 2018-03-15 NOTE — DISCHARGE NOTE ADULT - HOSPITAL COURSE
82 year old female with PMH HTN, DMT2, Dyslipidemia, CAD s/p PCI, Chronic pain presents to [facility] after falling at home, after her legs gave way upon arising from bed.  Examination significant for Right periorbital ecchymosis. UA significant for infection, otherwise investigations unremarkable except for right periorbital hematoma on CTH. await urine culture  Mild dehydration on renal indices     Problem/Plan - 1:  ·  Problem: Acute cystitis without hematuria.  Plan: Continue Rocephin  Urine culture negative.     Problem/Plan - 2:  ·  Problem: Fall, initial encounter.  Plan: Fall precautions  PT  LESLIE likely on 03/12/18.      Problem/Plan - 3:  ·  Problem: Essential hypertension.  Plan: DASH TLC Diet  Continue home antihypertensives.      Problem/Plan - 4:  ·  Problem: Type 2 diabetes mellitus without complication, without long-term current use of insulin.  Plan: Carb consistent diet  Continue Accuchecks  Sliding scale Insulins  A1c - 7  Resume home OHA upon discharge.      Problem/Plan - 5:  ·  Problem: Coronary artery disease involving native coronary artery of native heart without angina pectoris.  Plan: Continue ASA, Statin, BB.      Problem/Plan - 6:  Problem: Atrial fibrillation, unspecified type. Plan: Continue BB, Xarelto.     Problem/Plan - 7:  ·  Problem: Dyslipidemia.  Plan: Continue Statin.      Problem/Plan - 8:  ·  Problem: Other chronic pain.  Plan: Continue analgesics.      Problem/Plan - 9:  ·  Problem: Prophylactic measure.  Plan: VTE ppx - on Xarelto  C. diff. ppx - Florastor.     Attending Attestation:   I was physically present for the key portions of the evaluation and management (E/M) service provided.  I agree with the above history, physical, and plan which I have reviewed and edited where appropriate.     45 minutes spent on total encounter; more than 50% of the visit was spent counseling and/or coordinating care by the attending physician. 82 year old female with PMH HTN, DMT2, Dyslipidemia, CAD s/p PCI, atrial fib, Chronic pain presents to [facility] after falling at home, after her legs gave way upon arising from bed. Examination significant for Right periorbital ecchymosis, patient UA was significant for infection completed the course of antibiotics, investigations unremarkable except for right periorbital hematoma, Mild dehydration on renal indices on admission, On 3/12 her lower extremity are red hot swollen, treatment initiated for cellulitis. On 3/13 Antibiotics were changed to clindamycin secondary to rash from kefzol and Nephrology consulted for elevated creatinine, her rash continues to bother her and her creatinine continues to climb, 3/15 Renal function stabilizing as creatinine is slightly lower, Renal input appreciated, added florinef for hypotension and hyperkalemia, her potassium is also normalized.   Her repeat UA showed some WBCs, no symptoms, repeat urine cultures was growing 3 different organism and is looking like contaminated sample.   For her Hx of Fall, she was put on Fall precautions, PT evaluated the patient and recommended  For her Atrial fibrillation and CAD she was continued with her home meds, Xarelto, rate controlled  For her Dyslipidemia, she was continued with continue Statin and discontinue fenofibrate as triglycerides are normal, she needed repeat lipid panel as an outpatient.   he has Hx of chronic diastolic (congestive) heart failure, she was continued with Low sodium diet, and was continued with cardiac meds, repeat echo in 3 to 6 months.   She was found to have Cellulitis of lower extremity, she got allergic reaction to kefzol and was switched to po clinda for 7 days, Added benadry for rash, she finished course of antibiotics.   Over the course of hospitalization she got acute renal failure, Nephrology input appreciated, Possible allergy to kefzol?, she got Gentle hydration, avoided nephrotoxins, Added florinef, bun and creatinine has improved to normal range.   patient is doing well, her symptoms has been resolved, she is being discharged to [facility] in a stable condition.     Vital Signs Last 24 Hrs  T(C): 36.3 (19 Mar 2018 08:02), Max: 36.8 (19 Mar 2018 00:18)  T(F): 97.4 (19 Mar 2018 08:02), Max: 98.3 (19 Mar 2018 00:18)  HR: 78 (19 Mar 2018 08:02) (73 - 84)  BP: 122/64 (19 Mar 2018 08:02) (111/50 - 135/70)  RR: 18 (19 Mar 2018 08:02) (18 - 18)  SpO2: 97% (19 Mar 2018 08:02) (92% - 97%)      PHYSICAL EXAM:    GENERAL: Elderly female looking comfortable  HEENT: PERRL, +EOMI, Ecchymosis around the right eye is improving, has some swelling on the right temporal area.   NECK: soft, Supple, No JVD,   CHEST/LUNG: Decrease air entry bilaterally; No wheezing  HEART: S1S2+, Regular rate and rhythm; No murmurs  ABDOMEN: Soft, Nontender, Nondistended; Bowel sounds present  EXTREMITIES:  1+ Peripheral Pulses, some edema  SKIN:  Redness of the both lower ext more on the right leg, looks better then before.    NEURO: AAOX3, no focal deficits, no motor r sensory loss  PSYCH: normal mood    Total time spent 45minutes.

## 2018-03-15 NOTE — PROGRESS NOTE ADULT - PROBLEM SELECTOR PLAN 7
Patient is allergic to kefzol, swith to po clinda for 7 days.  Add benadry for rash. Last day of clinda is 3/17.

## 2018-03-15 NOTE — DISCHARGE NOTE ADULT - PLAN OF CARE
Prevent falls Transfer to [facility] when stable Patient completed course of antibiotics Baseline creatinine Baseline creatinine 0.7, has peaked at 2.9 now trending down Continue jerson Clindamycin thru 3/17 Continue cardiac medications Monitor glucose levels Rehab Resolved. Continue xarelto. completed the course keep legs elevated while sitting or laying. Patient diuretics has been resumed, need to get BMP in 1 week.  If BP is low can hold ramipril. Continue with home meds

## 2018-03-15 NOTE — PROGRESS NOTE ADULT - ASSESSMENT
82 year old female with PMH HTN, DMT2, Dyslipidemia, CAD s/p PCI, atrial fib, Chronic pain presents to [facility] after falling at home, after her legs gave way upon arising from bed. Examination significant for Right periorbital ecchymosis. UA significant for infection, otherwise investigations unremarkable except for right periorbital hematoma. Mild dehydration on renal indices on admission. On 3/12 her lower extremity are red hot swollen, treatment initiated for cellulitis. On 3/13 Antibiotics were changed to clindamycin secondary to rash from kefzol and Nephrology consulted for elevated creatinine. Her rash continues to bother her and her creatinine continues to climb. 3/15 Renal function stabilizing as creatinine is slightly lower. Renal input appreciated, add florinef for hypotension and hyperkalemia.

## 2018-03-15 NOTE — PROGRESS NOTE ADULT - ATTENDING COMMENTS
Kayexalate, follow up labs, low K  diet, Urine studies.
Kayexalate, follow up labs, low K  diet, added florinef for low bp and high K.
Hospitalist service to reassign care in am
Possible dc in am

## 2018-03-15 NOTE — PROGRESS NOTE ADULT - SUBJECTIVE AND OBJECTIVE BOX
NEPHROLOGY INTERVAL HPI/OVERNIGHT EVENTS: No new events.    MEDICATIONS  (STANDING):  ALBUTerol    0.083% 2.5 milliGRAM(s) Nebulizer every 6 hours  ALBUTerol    90 MICROgram(s) HFA Inhaler 1 Puff(s) Inhalation every 4 hours  aspirin enteric coated 81 milliGRAM(s) Oral daily  atorvastatin 80 milliGRAM(s) Oral at bedtime  clindamycin   Capsule 300 milliGRAM(s) Oral four times a day  dextrose 5%. 1000 milliLiter(s) (50 mL/Hr) IV Continuous <Continuous>  dextrose 50% Injectable 12.5 Gram(s) IV Push once  dextrose 50% Injectable 25 Gram(s) IV Push once  dextrose 50% Injectable 25 Gram(s) IV Push once  docusate sodium 100 milliGRAM(s) Oral three times a day  famotidine    Tablet 20 milliGRAM(s) Oral daily  insulin lispro (HumaLOG) corrective regimen sliding scale   SubCutaneous three times a day before meals  insulin lispro (HumaLOG) corrective regimen sliding scale   SubCutaneous at bedtime  magnesium oxide 400 milliGRAM(s) Oral daily  metoprolol     tartrate 12.5 milliGRAM(s) Oral two times a day  morphine ER Tablet 15 milliGRAM(s) Oral every 12 hours  oxybutynin 5 milliGRAM(s) Oral two times a day  rivaroxaban 15 milliGRAM(s) Oral every 24 hours  saccharomyces boulardii 250 milliGRAM(s) Oral two times a day  senna 2 Tablet(s) Oral at bedtime  sodium chloride 0.9%. 1000 milliLiter(s) (75 mL/Hr) IV Continuous <Continuous>    MEDICATIONS  (PRN):  bisacodyl Suppository 10 milliGRAM(s) Rectal daily PRN Constipation  cyclobenzaprine 10 milliGRAM(s) Oral at bedtime PRN Muscle Spasm  dextrose Gel 1 Dose(s) Oral once PRN Blood Glucose LESS THAN 70 milliGRAM(s)/deciliter  glucagon  Injectable 1 milliGRAM(s) IntraMuscular once PRN Glucose LESS THAN 70 milligrams/deciliter  guaiFENesin    Syrup 200 milliGRAM(s) Oral every 6 hours PRN Cough      Allergies    Cipro (Rash)  latex (Rash)          Vital Signs Last 24 Hrs     T(C): 37.1 (15 Mar 2018 08:57), Max: 37.1 (15 Mar 2018 08:57)  T(F): 98.8 (15 Mar 2018 08:57), Max: 98.8 (15 Mar 2018 08:57)  HR: 98 (15 Mar 2018 08:57) (68 - 98)  BP: 79/51 (15 Mar 2018 08:57) (79/51 - 108/62)  BP(mean): --  RR: 18 (15 Mar 2018 08:57) (17 - 18)  SpO2: 98% (15 Mar 2018 08:57) (98% - 99%)  T(C): 36.1 (13 Mar 2018 23:00), Max: 37 (13 Mar 2018 17:33)  T(F): 97 (13 Mar 2018 23:00), Max: 98.6 (13 Mar 2018 17:33)  HR: 69 (14 Mar 2018 05:08) (65 - 96)  BP: 118/66 (14 Mar 2018 05:08) (100/60 - 118/66)  BP(mean): --  RR: 18 (13 Mar 2018 23:00) (18 - 18)  SpO2: 96% (13 Mar 2018 23:00) (96% - 96%)  Daily     Daily     PHYSICAL EXAM:    GENERAL: appears acutely ill  HEAD:  ecchymosis right orbit area same  EYES: wnl  ENMT:   NECK: veins wnl  NERVOUS SYSTEM:  alert, oriented  CHEST/LUNG: decreased  bs  bases  HEART:  no  rub or gallop  ABDOMEN: obese, soft, not tender  EXTREMITIES: edema with erythema  both lower legs   LYMPH: wnl  SKIN: diffuse erythema   neg    LABS:  03-15    137  |  100  |  88.0<H>  ----------------------------<  123<H>  6.2<HH>   |  23.0  |  2.71<H>    Ca    8.2<L>      15 Mar 2018 08:14  Phos  4.8     15  Mg     2.0     15    TPro  4.9<L>  /  Alb  2.4<L>  /  TBili  0.4  /  DBili  x   /  AST  22  /  ALT  13  /  AlkPhos  42  03-14                          10.1   10.8  )-----------( 266      ( 14 Mar 2018 06:50 )             32.0     14    138  |  102  |  79.0<H>  ----------------------------<  156<H>  5.6<H>   |  23.0  |  2.90<H>    Ca    8.1<L>      14 Mar 2018 06:50  Phos  4.4     03-12  Mg     1.8     03-13    TPro  4.9<L>  /  Alb  2.4<L>  /  TBili  0.4  /  DBili  x   /  AST  22  /  ALT  13  /  AlkPhos  42  03-14      Urinalysis Basic - ( 13 Mar 2018 23:36 )    Color: Yellow / Appearance: Clear / S.020 / pH: x  Gluc: x / Ketone: Negative  / Bili: Negative / Urobili: 1 mg/dL   Blood: x / Protein: 30 mg/dL / Nitrite: Negative   Leuk Esterase: Moderate / RBC: 3-5 /HPF / WBC 6-10   Sq Epi: x / Non Sq Epi: Few / Bacteria: Occasional              RADIOLOGY & ADDITIONAL TESTS:

## 2018-03-16 LAB
ANION GAP SERPL CALC-SCNC: 13 MMOL/L — SIGNIFICANT CHANGE UP (ref 5–17)
BASOPHILS # BLD AUTO: 0 K/UL — SIGNIFICANT CHANGE UP (ref 0–0.2)
BASOPHILS NFR BLD AUTO: 0.1 % — SIGNIFICANT CHANGE UP (ref 0–2)
BUN SERPL-MCNC: 91 MG/DL — HIGH (ref 8–20)
CALCIUM SERPL-MCNC: 8.2 MG/DL — LOW (ref 8.6–10.2)
CHLORIDE SERPL-SCNC: 102 MMOL/L — SIGNIFICANT CHANGE UP (ref 98–107)
CO2 SERPL-SCNC: 22 MMOL/L — SIGNIFICANT CHANGE UP (ref 22–29)
CREAT SERPL-MCNC: 2.29 MG/DL — HIGH (ref 0.5–1.3)
EOSINOPHIL # BLD AUTO: 0.4 K/UL — SIGNIFICANT CHANGE UP (ref 0–0.5)
EOSINOPHIL NFR BLD AUTO: 4.9 % — SIGNIFICANT CHANGE UP (ref 0–6)
GLUCOSE BLDC GLUCOMTR-MCNC: 140 MG/DL — HIGH (ref 70–99)
GLUCOSE BLDC GLUCOMTR-MCNC: 191 MG/DL — HIGH (ref 70–99)
GLUCOSE BLDC GLUCOMTR-MCNC: 200 MG/DL — HIGH (ref 70–99)
GLUCOSE BLDC GLUCOMTR-MCNC: 238 MG/DL — HIGH (ref 70–99)
GLUCOSE SERPL-MCNC: 119 MG/DL — HIGH (ref 70–115)
HCT VFR BLD CALC: 35.5 % — LOW (ref 37–47)
HGB BLD-MCNC: 11.4 G/DL — LOW (ref 12–16)
LYMPHOCYTES # BLD AUTO: 0.4 K/UL — LOW (ref 1–4.8)
LYMPHOCYTES # BLD AUTO: 4.9 % — LOW (ref 20–55)
MAGNESIUM SERPL-MCNC: 1.9 MG/DL — SIGNIFICANT CHANGE UP (ref 1.6–2.6)
MCHC RBC-ENTMCNC: 28.1 PG — SIGNIFICANT CHANGE UP (ref 27–31)
MCHC RBC-ENTMCNC: 32.1 G/DL — SIGNIFICANT CHANGE UP (ref 32–36)
MCV RBC AUTO: 87.7 FL — SIGNIFICANT CHANGE UP (ref 81–99)
MONOCYTES # BLD AUTO: 0.4 K/UL — SIGNIFICANT CHANGE UP (ref 0–0.8)
MONOCYTES NFR BLD AUTO: 4.2 % — SIGNIFICANT CHANGE UP (ref 3–10)
NEUTROPHILS # BLD AUTO: 7.8 K/UL — SIGNIFICANT CHANGE UP (ref 1.8–8)
NEUTROPHILS NFR BLD AUTO: 85 % — HIGH (ref 37–73)
PHOSPHATE SERPL-MCNC: 4.5 MG/DL — SIGNIFICANT CHANGE UP (ref 2.4–4.7)
PLATELET # BLD AUTO: 301 K/UL — SIGNIFICANT CHANGE UP (ref 150–400)
POTASSIUM SERPL-MCNC: 5 MMOL/L — SIGNIFICANT CHANGE UP (ref 3.5–5.3)
POTASSIUM SERPL-SCNC: 5 MMOL/L — SIGNIFICANT CHANGE UP (ref 3.5–5.3)
RBC # BLD: 4.05 M/UL — LOW (ref 4.4–5.2)
RBC # FLD: 15.9 % — HIGH (ref 11–15.6)
SODIUM SERPL-SCNC: 137 MMOL/L — SIGNIFICANT CHANGE UP (ref 135–145)
WBC # BLD: 9.2 K/UL — SIGNIFICANT CHANGE UP (ref 4.8–10.8)
WBC # FLD AUTO: 9.2 K/UL — SIGNIFICANT CHANGE UP (ref 4.8–10.8)

## 2018-03-16 PROCEDURE — 99233 SBSQ HOSP IP/OBS HIGH 50: CPT

## 2018-03-16 RX ADMIN — Medication 1: at 17:08

## 2018-03-16 RX ADMIN — Medication 300 MILLIGRAM(S): at 05:54

## 2018-03-16 RX ADMIN — Medication 1 APPLICATION(S): at 13:24

## 2018-03-16 RX ADMIN — FLUDROCORTISONE ACETATE 0.1 MILLIGRAM(S): 0.1 TABLET ORAL at 17:07

## 2018-03-16 RX ADMIN — FLUDROCORTISONE ACETATE 0.1 MILLIGRAM(S): 0.1 TABLET ORAL at 05:55

## 2018-03-16 RX ADMIN — Medication 200 MILLIGRAM(S): at 05:55

## 2018-03-16 RX ADMIN — Medication 250 MILLIGRAM(S): at 17:07

## 2018-03-16 RX ADMIN — RIVAROXABAN 15 MILLIGRAM(S): KIT at 17:07

## 2018-03-16 RX ADMIN — SENNA PLUS 2 TABLET(S): 8.6 TABLET ORAL at 21:33

## 2018-03-16 RX ADMIN — ALBUTEROL 2.5 MILLIGRAM(S): 90 AEROSOL, METERED ORAL at 21:41

## 2018-03-16 RX ADMIN — Medication 300 MILLIGRAM(S): at 11:39

## 2018-03-16 RX ADMIN — Medication 5 MILLIGRAM(S): at 17:07

## 2018-03-16 RX ADMIN — MORPHINE SULFATE 15 MILLIGRAM(S): 50 CAPSULE, EXTENDED RELEASE ORAL at 00:00

## 2018-03-16 RX ADMIN — ALBUTEROL 2.5 MILLIGRAM(S): 90 AEROSOL, METERED ORAL at 16:03

## 2018-03-16 RX ADMIN — ALBUTEROL 2.5 MILLIGRAM(S): 90 AEROSOL, METERED ORAL at 09:17

## 2018-03-16 RX ADMIN — MAGNESIUM OXIDE 400 MG ORAL TABLET 400 MILLIGRAM(S): 241.3 TABLET ORAL at 11:39

## 2018-03-16 RX ADMIN — Medication 12.5 MILLIGRAM(S): at 17:07

## 2018-03-16 RX ADMIN — Medication 100 MILLIGRAM(S): at 05:55

## 2018-03-16 RX ADMIN — Medication 81 MILLIGRAM(S): at 11:39

## 2018-03-16 RX ADMIN — Medication 100 MILLIGRAM(S): at 21:33

## 2018-03-16 RX ADMIN — Medication 5 MILLIGRAM(S): at 05:55

## 2018-03-16 RX ADMIN — FAMOTIDINE 20 MILLIGRAM(S): 10 INJECTION INTRAVENOUS at 11:39

## 2018-03-16 RX ADMIN — Medication 250 MILLIGRAM(S): at 05:54

## 2018-03-16 RX ADMIN — Medication 300 MILLIGRAM(S): at 17:08

## 2018-03-16 RX ADMIN — ATORVASTATIN CALCIUM 80 MILLIGRAM(S): 80 TABLET, FILM COATED ORAL at 21:33

## 2018-03-16 RX ADMIN — Medication 300 MILLIGRAM(S): at 00:00

## 2018-03-16 RX ADMIN — Medication 200 MILLIGRAM(S): at 21:33

## 2018-03-16 RX ADMIN — MORPHINE SULFATE 15 MILLIGRAM(S): 50 CAPSULE, EXTENDED RELEASE ORAL at 01:00

## 2018-03-16 RX ADMIN — Medication 300 MILLIGRAM(S): at 23:47

## 2018-03-16 RX ADMIN — Medication 1: at 11:40

## 2018-03-16 NOTE — PROGRESS NOTE ADULT - SUBJECTIVE AND OBJECTIVE BOX
NEPHROLOGY INTERVAL HPI/OVERNIGHT EVENTS:    Examined earlier    MEDICATIONS  (STANDING):  ALBUTerol    0.083% 2.5 milliGRAM(s) Nebulizer every 6 hours  ALBUTerol    90 MICROgram(s) HFA Inhaler 1 Puff(s) Inhalation every 4 hours  aspirin enteric coated 81 milliGRAM(s) Oral daily  atorvastatin 80 milliGRAM(s) Oral at bedtime  clindamycin   Capsule 300 milliGRAM(s) Oral four times a day  dextrose 5%. 1000 milliLiter(s) (50 mL/Hr) IV Continuous <Continuous>  dextrose 50% Injectable 12.5 Gram(s) IV Push once  dextrose 50% Injectable 25 Gram(s) IV Push once  dextrose 50% Injectable 25 Gram(s) IV Push once  docusate sodium 100 milliGRAM(s) Oral three times a day  famotidine    Tablet 20 milliGRAM(s) Oral daily  fludroCORTISONE 0.1 milliGRAM(s) Oral two times a day  hydrocortisone 1% Cream 1 Application(s) Topical daily  insulin lispro (HumaLOG) corrective regimen sliding scale   SubCutaneous three times a day before meals  insulin lispro (HumaLOG) corrective regimen sliding scale   SubCutaneous at bedtime  magnesium oxide 400 milliGRAM(s) Oral daily  metoprolol     tartrate 12.5 milliGRAM(s) Oral two times a day  oxybutynin 5 milliGRAM(s) Oral two times a day  polyethylene glycol 3350 17 Gram(s) Oral daily  rivaroxaban 15 milliGRAM(s) Oral every 24 hours  saccharomyces boulardii 250 milliGRAM(s) Oral two times a day  senna 2 Tablet(s) Oral at bedtime    MEDICATIONS  (PRN):  bisacodyl Suppository 10 milliGRAM(s) Rectal daily PRN Constipation  cyclobenzaprine 10 milliGRAM(s) Oral at bedtime PRN Muscle Spasm  dextrose Gel 1 Dose(s) Oral once PRN Blood Glucose LESS THAN 70 milliGRAM(s)/deciliter  diphenhydrAMINE   Capsule 50 milliGRAM(s) Oral every 4 hours PRN Rash and/or Itching  diphenhydrAMINE   Injectable 50 milliGRAM(s) IV Push every 4 hours PRN Rash and/or Itching  glucagon  Injectable 1 milliGRAM(s) IntraMuscular once PRN Glucose LESS THAN 70 milligrams/deciliter  guaiFENesin    Syrup 200 milliGRAM(s) Oral every 6 hours PRN Cough      Allergies    Cipro (Rash)  latex (Rash)    Intolerances        Vital Signs Last 24 Hrs  T(C): 36.6 (16 Mar 2018 08:15), Max: 37 (15 Mar 2018 20:44)  T(F): 97.8 (16 Mar 2018 08:15), Max: 98.6 (15 Mar 2018 20:44)  HR: 77 (16 Mar 2018 08:15) (77 - 77)  BP: 91/48 (16 Mar 2018 08:15) (91/48 - 98/56)  BP(mean): --  RR: 18 (16 Mar 2018 08:15) (17 - 18)  SpO2: 99% (16 Mar 2018 08:15) (99% - 99%)  Daily     Daily     PHYSICAL EXAM:  GENERAL: NAD,   HEAD:  No facial edema   NECK: Supple, No JVD,  CHEST/LUNG: EAE , No wheeze   HEART: Regular rate and rhythm; No murmurs, rubs, or gallops  ABDOMEN: Obese , nontender   EXTREMITIES:  chronic edema         LABS:                        11.4   9.2   )-----------( 301      ( 16 Mar 2018 09:21 )             35.5     03-16    137  |  102  |  91.0<H>  ----------------------------<  119<H>  5.0   |  22.0  |  2.29<H>    Ca    8.2<L>      16 Mar 2018 08:07  Phos  4.5     03-16  Mg     1.9     03-16          Magnesium, Serum: 1.9 mg/dL (03-16 @ 09:21)  Phosphorus Level, Serum: 4.5 mg/dL (03-16 @ 09:21)          RADIOLOGY & ADDITIONAL TESTS:

## 2018-03-16 NOTE — PROGRESS NOTE ADULT - PROBLEM SELECTOR PLAN 8
Nephrology input appreciated. Possible allergy to kefzol?  Gentle hydration, avoid nephrotoxins, kayexalate for renal failure. Add florinef, will continue to monitor BMP along with I/Os

## 2018-03-16 NOTE — PROGRESS NOTE ADULT - ASSESSMENT
82 year old female with PMH HTN, DMT2, Dyslipidemia, CAD s/p PCI, atrial fib, Chronic pain presents to [facility] after falling at home, after her legs gave way upon arising from bed. Examination significant for Right periorbital ecchymosis, patient UA was significant for infection completed the course of antibiotics, investigations unremarkable except for right periorbital hematoma, Mild dehydration on renal indices on admission, On 3/12 her lower extremity are red hot swollen, treatment initiated for cellulitis. On 3/13 Antibiotics were changed to clindamycin secondary to rash from kefzol and Nephrology consulted for elevated creatinine, her rash continues to bother her and her creatinine continues to climb, 3/15 Renal function stabilizing as creatinine is slightly lower, Renal input appreciated, add florinef for hypotension and hyperkalemia, her potassium is also normalized.

## 2018-03-16 NOTE — PROGRESS NOTE ADULT - SUBJECTIVE AND OBJECTIVE BOX
FLEX MORALES    [id number]    82y      Female    Patient is a 82y old  Female who presents with a chief complaint of Trauma B s/p fall from bed on Xarelto (15 Mar 2018 09:00)      INTERVAL HPI/OVERNIGHT EVENTS:    Patient is having generalized weakness, redness of the lower ext is stable, no worsening, has no fever, denies chills, chest pain, nausea, vomiting.     REVIEW OF SYSTEMS:    CONSTITUTIONAL: No fever, some fatigue  RESPIRATORY: No cough, No shortness of breath  CARDIOVASCULAR: No chest pain, palpitations  GASTROINTESTINAL: No abdominal, No nausea, vomiting  NEUROLOGICAL: No headaches,  loss of strength.  MISCELLANEOUS: No joint swelling or pain       Vital Signs Last 24 Hrs  T(C): 36.6 (16 Mar 2018 08:15), Max: 37 (15 Mar 2018 20:44)  T(F): 97.8 (16 Mar 2018 08:15), Max: 98.6 (15 Mar 2018 20:44)  HR: 77 (16 Mar 2018 08:15) (77 - 77)  BP: 91/48 (16 Mar 2018 08:15) (91/48 - 98/56)  RR: 18 (16 Mar 2018 08:15) (17 - 18)  SpO2: 99% (16 Mar 2018 08:15) (99% - 99%)    PHYSICAL EXAM:    GENERAL: Elderly female looking comfortable  HEENT: PERRL, +EOMI  NECK: soft, Supple, No JVD,   CHEST/LUNG: Decrease air entry bilaterally; No wheezing  HEART: S1S2+, Regular rate and rhythm; No murmurs  ABDOMEN: Soft, Nontender, Nondistended; Bowel sounds present  EXTREMITIES:  1+ Peripheral Pulses, some edema  SKIN: urticaria in the upper chest, redness of the both lower ext more on the right leg   NEURO: AAOX3, no focal deficits, no motor r sensory loss  PSYCH: normal mood      LABS:                        11.4   9.2   )-----------( 301      ( 16 Mar 2018 09:21 )             35.5     03-16    137  |  102  |  91.0<H>  ----------------------------<  119<H>  5.0   |  22.0  |  2.29<H>    Ca    8.2<L>      16 Mar 2018 08:07  Phos  4.5     03-16  Mg     1.9     03-16              I&O's Summary    16 Mar 2018 07:01  -  16 Mar 2018 11:45  --------------------------------------------------------  IN: 0 mL / OUT: 50 mL / NET: -50 mL        MEDICATIONS  (STANDING):  ALBUTerol    0.083% 2.5 milliGRAM(s) Nebulizer every 6 hours  ALBUTerol    90 MICROgram(s) HFA Inhaler 1 Puff(s) Inhalation every 4 hours  aspirin enteric coated 81 milliGRAM(s) Oral daily  atorvastatin 80 milliGRAM(s) Oral at bedtime  clindamycin   Capsule 300 milliGRAM(s) Oral four times a day  dextrose 5%. 1000 milliLiter(s) (50 mL/Hr) IV Continuous <Continuous>  dextrose 50% Injectable 12.5 Gram(s) IV Push once  dextrose 50% Injectable 25 Gram(s) IV Push once  dextrose 50% Injectable 25 Gram(s) IV Push once  docusate sodium 100 milliGRAM(s) Oral three times a day  famotidine    Tablet 20 milliGRAM(s) Oral daily  fludroCORTISONE 0.1 milliGRAM(s) Oral two times a day  hydrocortisone 1% Cream 1 Application(s) Topical daily  insulin lispro (HumaLOG) corrective regimen sliding scale   SubCutaneous three times a day before meals  insulin lispro (HumaLOG) corrective regimen sliding scale   SubCutaneous at bedtime  magnesium oxide 400 milliGRAM(s) Oral daily  metoprolol     tartrate 12.5 milliGRAM(s) Oral two times a day  oxybutynin 5 milliGRAM(s) Oral two times a day  polyethylene glycol 3350 17 Gram(s) Oral daily  rivaroxaban 15 milliGRAM(s) Oral every 24 hours  saccharomyces boulardii 250 milliGRAM(s) Oral two times a day  senna 2 Tablet(s) Oral at bedtime    MEDICATIONS  (PRN):  bisacodyl Suppository 10 milliGRAM(s) Rectal daily PRN Constipation  cyclobenzaprine 10 milliGRAM(s) Oral at bedtime PRN Muscle Spasm  dextrose Gel 1 Dose(s) Oral once PRN Blood Glucose LESS THAN 70 milliGRAM(s)/deciliter  diphenhydrAMINE   Capsule 50 milliGRAM(s) Oral every 4 hours PRN Rash and/or Itching  diphenhydrAMINE   Injectable 50 milliGRAM(s) IV Push every 4 hours PRN Rash and/or Itching  glucagon  Injectable 1 milliGRAM(s) IntraMuscular once PRN Glucose LESS THAN 70 milligrams/deciliter  guaiFENesin    Syrup 200 milliGRAM(s) Oral every 6 hours PRN Cough

## 2018-03-16 NOTE — PROGRESS NOTE ADULT - ASSESSMENT
PRUDENCE on CKD with rash - AIN vs ATN cr trending down  No hydro on renal sono  HyperKalemia somewhat better  cont Florinef for low BP high K  AM labs

## 2018-03-17 LAB
ANION GAP SERPL CALC-SCNC: 12 MMOL/L — SIGNIFICANT CHANGE UP (ref 5–17)
BUN SERPL-MCNC: 82 MG/DL — HIGH (ref 8–20)
CALCIUM SERPL-MCNC: 8.6 MG/DL — SIGNIFICANT CHANGE UP (ref 8.6–10.2)
CHLORIDE SERPL-SCNC: 102 MMOL/L — SIGNIFICANT CHANGE UP (ref 98–107)
CO2 SERPL-SCNC: 24 MMOL/L — SIGNIFICANT CHANGE UP (ref 22–29)
CREAT SERPL-MCNC: 1.55 MG/DL — HIGH (ref 0.5–1.3)
CULTURE RESULTS: SIGNIFICANT CHANGE UP
GLUCOSE BLDC GLUCOMTR-MCNC: 158 MG/DL — HIGH (ref 70–99)
GLUCOSE BLDC GLUCOMTR-MCNC: 167 MG/DL — HIGH (ref 70–99)
GLUCOSE BLDC GLUCOMTR-MCNC: 188 MG/DL — HIGH (ref 70–99)
GLUCOSE BLDC GLUCOMTR-MCNC: 189 MG/DL — HIGH (ref 70–99)
GLUCOSE SERPL-MCNC: 166 MG/DL — HIGH (ref 70–115)
HCT VFR BLD CALC: 31.9 % — LOW (ref 37–47)
HGB BLD-MCNC: 10.1 G/DL — LOW (ref 12–16)
MCHC RBC-ENTMCNC: 27.6 PG — SIGNIFICANT CHANGE UP (ref 27–31)
MCHC RBC-ENTMCNC: 31.7 G/DL — LOW (ref 32–36)
MCV RBC AUTO: 87.2 FL — SIGNIFICANT CHANGE UP (ref 81–99)
PLATELET # BLD AUTO: 295 K/UL — SIGNIFICANT CHANGE UP (ref 150–400)
POTASSIUM SERPL-MCNC: 4.5 MMOL/L — SIGNIFICANT CHANGE UP (ref 3.5–5.3)
POTASSIUM SERPL-SCNC: 4.5 MMOL/L — SIGNIFICANT CHANGE UP (ref 3.5–5.3)
RBC # BLD: 3.66 M/UL — LOW (ref 4.4–5.2)
RBC # FLD: 15.7 % — HIGH (ref 11–15.6)
SODIUM SERPL-SCNC: 138 MMOL/L — SIGNIFICANT CHANGE UP (ref 135–145)
SPECIMEN SOURCE: SIGNIFICANT CHANGE UP
WBC # BLD: 7.4 K/UL — SIGNIFICANT CHANGE UP (ref 4.8–10.8)
WBC # FLD AUTO: 7.4 K/UL — SIGNIFICANT CHANGE UP (ref 4.8–10.8)

## 2018-03-17 PROCEDURE — 99233 SBSQ HOSP IP/OBS HIGH 50: CPT

## 2018-03-17 RX ADMIN — ALBUTEROL 2.5 MILLIGRAM(S): 90 AEROSOL, METERED ORAL at 15:58

## 2018-03-17 RX ADMIN — Medication 5 MILLIGRAM(S): at 17:19

## 2018-03-17 RX ADMIN — Medication 200 MILLIGRAM(S): at 11:58

## 2018-03-17 RX ADMIN — Medication 50 MILLIGRAM(S): at 23:39

## 2018-03-17 RX ADMIN — Medication 1: at 07:56

## 2018-03-17 RX ADMIN — ALBUTEROL 2.5 MILLIGRAM(S): 90 AEROSOL, METERED ORAL at 03:15

## 2018-03-17 RX ADMIN — Medication 12.5 MILLIGRAM(S): at 05:13

## 2018-03-17 RX ADMIN — ALBUTEROL 2.5 MILLIGRAM(S): 90 AEROSOL, METERED ORAL at 21:16

## 2018-03-17 RX ADMIN — FLUDROCORTISONE ACETATE 0.1 MILLIGRAM(S): 0.1 TABLET ORAL at 17:18

## 2018-03-17 RX ADMIN — FLUDROCORTISONE ACETATE 0.1 MILLIGRAM(S): 0.1 TABLET ORAL at 05:13

## 2018-03-17 RX ADMIN — Medication 300 MILLIGRAM(S): at 23:38

## 2018-03-17 RX ADMIN — Medication 1: at 17:18

## 2018-03-17 RX ADMIN — Medication 200 MILLIGRAM(S): at 21:05

## 2018-03-17 RX ADMIN — Medication 100 MILLIGRAM(S): at 05:13

## 2018-03-17 RX ADMIN — ALBUTEROL 2.5 MILLIGRAM(S): 90 AEROSOL, METERED ORAL at 09:05

## 2018-03-17 RX ADMIN — Medication 5 MILLIGRAM(S): at 05:13

## 2018-03-17 RX ADMIN — Medication 1 APPLICATION(S): at 12:04

## 2018-03-17 RX ADMIN — ATORVASTATIN CALCIUM 80 MILLIGRAM(S): 80 TABLET, FILM COATED ORAL at 21:05

## 2018-03-17 RX ADMIN — Medication 250 MILLIGRAM(S): at 05:13

## 2018-03-17 RX ADMIN — MAGNESIUM OXIDE 400 MG ORAL TABLET 400 MILLIGRAM(S): 241.3 TABLET ORAL at 11:58

## 2018-03-17 RX ADMIN — Medication 300 MILLIGRAM(S): at 11:58

## 2018-03-17 RX ADMIN — Medication 1: at 11:58

## 2018-03-17 RX ADMIN — RIVAROXABAN 15 MILLIGRAM(S): KIT at 17:19

## 2018-03-17 RX ADMIN — Medication 300 MILLIGRAM(S): at 17:19

## 2018-03-17 RX ADMIN — FAMOTIDINE 20 MILLIGRAM(S): 10 INJECTION INTRAVENOUS at 11:58

## 2018-03-17 RX ADMIN — Medication 250 MILLIGRAM(S): at 17:18

## 2018-03-17 RX ADMIN — Medication 81 MILLIGRAM(S): at 11:58

## 2018-03-17 RX ADMIN — Medication 100 MILLIGRAM(S): at 14:23

## 2018-03-17 RX ADMIN — Medication 300 MILLIGRAM(S): at 05:13

## 2018-03-17 NOTE — PROGRESS NOTE ADULT - PROBLEM SELECTOR PLAN 3
Continue ASA, Statin, BB. Ace inhibitor discontinues secondary to renal failure. Continue ASA, Statin, BB. Ace inhibitor discontinues secondary to renal failure, will resume once kidney function is better.

## 2018-03-17 NOTE — PROGRESS NOTE ADULT - SUBJECTIVE AND OBJECTIVE BOX
FLEX MORALES    [id number]    82y      Female    Patient is a 82y old  Female who presents with a chief complaint of Trauma B s/p fall from bed on Xarelto (15 Mar 2018 09:00)      INTERVAL HPI/OVERNIGHT EVENTS:      Patient is having generalized weakness, redness of the lower ext is stable, no worsening, has no fever, denies chills, chest pain, nausea, vomiting.     REVIEW OF SYSTEMS:    CONSTITUTIONAL: No fever, some fatigue  RESPIRATORY: No cough, No shortness of breath  CARDIOVASCULAR: No chest pain, palpitations  GASTROINTESTINAL: No abdominal, No nausea, vomiting  NEUROLOGICAL: No headaches,  loss of strength.  MISCELLANEOUS: No joint swelling or pain       Vital Signs Last 24 Hrs  T(C): 36.5 (17 Mar 2018 07:49), Max: 36.5 (17 Mar 2018 07:49)  T(F): 97.7 (17 Mar 2018 07:49), Max: 97.7 (17 Mar 2018 07:49)  HR: 100 (17 Mar 2018 07:49) (90 - 100)  BP: 105/6 (17 Mar 2018 07:49) (104/62 - 112/58)  BP(mean): --  RR: 18 (17 Mar 2018 07:49) (18 - 18)  SpO2: 97% (17 Mar 2018 07:49) (97% - 98%)    PHYSICAL EXAM:    GENERAL: Elderly female looking comfortable  HEENT: PERRL, +EOMI  NECK: soft, Supple, No JVD,   CHEST/LUNG: Decrease air entry bilaterally; No wheezing  HEART: S1S2+, Regular rate and rhythm; No murmurs  ABDOMEN: Soft, Nontender, Nondistended; Bowel sounds present  EXTREMITIES:  1+ Peripheral Pulses, some edema  SKIN: urticaria in the upper chest, redness of the both lower ext more on the right leg   NEURO: AAOX3, no focal deficits, no motor r sensory loss  PSYCH: normal mood      LABS:                        10.1   7.4   )-----------( 295      ( 17 Mar 2018 07:30 )             31.9     03-17    138  |  102  |  82.0<H>  ----------------------------<  166<H>  4.5   |  24.0  |  1.55<H>    Ca    8.6      17 Mar 2018 07:30  Phos  4.5     03-16  Mg     1.9     03-16      I&O's Summary    16 Mar 2018 07:01  -  17 Mar 2018 07:00  --------------------------------------------------------  IN: 0 mL / OUT: 51 mL / NET: -51 mL        MEDICATIONS  (STANDING):  ALBUTerol    0.083% 2.5 milliGRAM(s) Nebulizer every 6 hours  ALBUTerol    90 MICROgram(s) HFA Inhaler 1 Puff(s) Inhalation every 4 hours  aspirin enteric coated 81 milliGRAM(s) Oral daily  atorvastatin 80 milliGRAM(s) Oral at bedtime  clindamycin   Capsule 300 milliGRAM(s) Oral four times a day  dextrose 5%. 1000 milliLiter(s) (50 mL/Hr) IV Continuous <Continuous>  dextrose 50% Injectable 12.5 Gram(s) IV Push once  dextrose 50% Injectable 25 Gram(s) IV Push once  dextrose 50% Injectable 25 Gram(s) IV Push once  docusate sodium 100 milliGRAM(s) Oral three times a day  famotidine    Tablet 20 milliGRAM(s) Oral daily  fludroCORTISONE 0.1 milliGRAM(s) Oral two times a day  hydrocortisone 1% Cream 1 Application(s) Topical daily  insulin lispro (HumaLOG) corrective regimen sliding scale   SubCutaneous three times a day before meals  insulin lispro (HumaLOG) corrective regimen sliding scale   SubCutaneous at bedtime  magnesium oxide 400 milliGRAM(s) Oral daily  metoprolol     tartrate 12.5 milliGRAM(s) Oral two times a day  oxybutynin 5 milliGRAM(s) Oral two times a day  polyethylene glycol 3350 17 Gram(s) Oral daily  rivaroxaban 15 milliGRAM(s) Oral every 24 hours  saccharomyces boulardii 250 milliGRAM(s) Oral two times a day  senna 2 Tablet(s) Oral at bedtime    MEDICATIONS  (PRN):  bisacodyl Suppository 10 milliGRAM(s) Rectal daily PRN Constipation  cyclobenzaprine 10 milliGRAM(s) Oral at bedtime PRN Muscle Spasm  dextrose Gel 1 Dose(s) Oral once PRN Blood Glucose LESS THAN 70 milliGRAM(s)/deciliter  diphenhydrAMINE   Capsule 50 milliGRAM(s) Oral every 4 hours PRN Rash and/or Itching  diphenhydrAMINE   Injectable 50 milliGRAM(s) IV Push every 4 hours PRN Rash and/or Itching  glucagon  Injectable 1 milliGRAM(s) IntraMuscular once PRN Glucose LESS THAN 70 milligrams/deciliter  guaiFENesin    Syrup 200 milliGRAM(s) Oral every 6 hours PRN Cough FLEX MORALES    [id number]    82y      Female    Patient is a 82y old  Female who presents with a chief complaint of Trauma B s/p fall from bed on Xarelto (15 Mar 2018 09:00)      INTERVAL HPI/OVERNIGHT EVENTS:      Patient's redness of the lower ext is improving, no worsening, has no fever, denies chills, chest pain, nausea, vomiting.     REVIEW OF SYSTEMS:    CONSTITUTIONAL: No fever, some fatigue  RESPIRATORY: No cough, No shortness of breath  CARDIOVASCULAR: No chest pain, palpitations  GASTROINTESTINAL: No abdominal, No nausea, vomiting  NEUROLOGICAL: No headaches,  loss of strength.  MISCELLANEOUS: No joint swelling or pain       Vital Signs Last 24 Hrs  T(C): 36.5 (17 Mar 2018 07:49), Max: 36.5 (17 Mar 2018 07:49)  T(F): 97.7 (17 Mar 2018 07:49), Max: 97.7 (17 Mar 2018 07:49)  HR: 100 (17 Mar 2018 07:49) (90 - 100)  BP: 105/6 (17 Mar 2018 07:49) (104/62 - 112/58  RR: 18 (17 Mar 2018 07:49) (18 - 18)  SpO2: 97% (17 Mar 2018 07:49) (97% - 98%)    PHYSICAL EXAM:    GENERAL: Elderly female looking comfortable  HEENT: PERRL, +EOMI  NECK: soft, Supple, No JVD,   CHEST/LUNG: Decrease air entry bilaterally; No wheezing  HEART: S1S2+, Regular rate and rhythm; No murmurs  ABDOMEN: Soft, Nontender, Nondistended; Bowel sounds present  EXTREMITIES:  1+ Peripheral Pulses, some edema  SKIN:  Redness of the both lower ext more on the right leg   NEURO: AAOX3, no focal deficits, no motor r sensory loss  PSYCH: normal mood      LABS:                        10.1   7.4   )-----------( 295      ( 17 Mar 2018 07:30 )             31.9     03-17    138  |  102  |  82.0<H>  ----------------------------<  166<H>  4.5   |  24.0  |  1.55<H>    Ca    8.6      17 Mar 2018 07:30  Phos  4.5     03-16  Mg     1.9     03-16      I&O's Summary    16 Mar 2018 07:01  -  17 Mar 2018 07:00  --------------------------------------------------------  IN: 0 mL / OUT: 51 mL / NET: -51 mL        MEDICATIONS  (STANDING):  ALBUTerol    0.083% 2.5 milliGRAM(s) Nebulizer every 6 hours  ALBUTerol    90 MICROgram(s) HFA Inhaler 1 Puff(s) Inhalation every 4 hours  aspirin enteric coated 81 milliGRAM(s) Oral daily  atorvastatin 80 milliGRAM(s) Oral at bedtime  clindamycin   Capsule 300 milliGRAM(s) Oral four times a day  dextrose 5%. 1000 milliLiter(s) (50 mL/Hr) IV Continuous <Continuous>  dextrose 50% Injectable 12.5 Gram(s) IV Push once  dextrose 50% Injectable 25 Gram(s) IV Push once  dextrose 50% Injectable 25 Gram(s) IV Push once  docusate sodium 100 milliGRAM(s) Oral three times a day  famotidine    Tablet 20 milliGRAM(s) Oral daily  fludroCORTISONE 0.1 milliGRAM(s) Oral two times a day  hydrocortisone 1% Cream 1 Application(s) Topical daily  insulin lispro (HumaLOG) corrective regimen sliding scale   SubCutaneous three times a day before meals  insulin lispro (HumaLOG) corrective regimen sliding scale   SubCutaneous at bedtime  magnesium oxide 400 milliGRAM(s) Oral daily  metoprolol     tartrate 12.5 milliGRAM(s) Oral two times a day  oxybutynin 5 milliGRAM(s) Oral two times a day  polyethylene glycol 3350 17 Gram(s) Oral daily  rivaroxaban 15 milliGRAM(s) Oral every 24 hours  saccharomyces boulardii 250 milliGRAM(s) Oral two times a day  senna 2 Tablet(s) Oral at bedtime    MEDICATIONS  (PRN):  bisacodyl Suppository 10 milliGRAM(s) Rectal daily PRN Constipation  cyclobenzaprine 10 milliGRAM(s) Oral at bedtime PRN Muscle Spasm  dextrose Gel 1 Dose(s) Oral once PRN Blood Glucose LESS THAN 70 milliGRAM(s)/deciliter  diphenhydrAMINE   Capsule 50 milliGRAM(s) Oral every 4 hours PRN Rash and/or Itching  diphenhydrAMINE   Injectable 50 milliGRAM(s) IV Push every 4 hours PRN Rash and/or Itching  glucagon  Injectable 1 milliGRAM(s) IntraMuscular once PRN Glucose LESS THAN 70 milligrams/deciliter  guaiFENesin    Syrup 200 milliGRAM(s) Oral every 6 hours PRN Cough

## 2018-03-17 NOTE — PROGRESS NOTE ADULT - PROBLEM SELECTOR PLAN 8
Nephrology input appreciated. Possible allergy to kefzol?  Gentle hydration, avoid nephrotoxins, kayexalate for renal failure. Add florinef, will continue to monitor BMP along with I/Os Nephrology input appreciated, Possible allergy to kefzol?, Gentle hydration, avoid nephrotoxins, Added florinef, bun and creatinine is improving, will continue to monitor BMP along with I/Os

## 2018-03-17 NOTE — PROGRESS NOTE ADULT - PROBLEM SELECTOR PLAN 1
Urine culture negative. 3/12 Patient has completed regimen of rocephin for uti. Urine culture negative. 3/12 Patient has completed regimen of Rocephin for uti.

## 2018-03-17 NOTE — PROGRESS NOTE ADULT - PROBLEM SELECTOR PLAN 6
Low sodium diet, continue cardiac meds, repeat echo in 3 to 6 months  Watch for edema on florinef Low sodium diet, continue cardiac meds, repeat echo in 3 to 6 months  Watch for edema on florinef.

## 2018-03-17 NOTE — PROGRESS NOTE ADULT - ASSESSMENT
PRUDENCE with rash - AIN vs ATN cr trending down  CKD  suspect 2/2 DM A1C 7.4  No hydro on renal sono  HyperKalemia somewhat better  cont Florinef for low BP high K  Awaiting todays labs

## 2018-03-17 NOTE — PROGRESS NOTE ADULT - ASSESSMENT
82 year old female with PMH HTN, DMT2, Dyslipidemia, CAD s/p PCI, atrial fib, Chronic pain presents to [facility] after falling at home, after her legs gave way upon arising from bed. Examination significant for Right periorbital ecchymosis, patient UA was significant for infection completed the course of antibiotics, investigations unremarkable except for right periorbital hematoma, Mild dehydration on renal indices on admission, On 3/12 her lower extremity are red hot swollen, treatment initiated for cellulitis. On 3/13 Antibiotics were changed to clindamycin secondary to rash from kefzol and Nephrology consulted for elevated creatinine, her rash continues to bother her and her creatinine continues to climb, 3/15 Renal function stabilizing as creatinine is slightly lower, Renal input appreciated, add florinef for hypotension and hyperkalemia, her potassium is also normalized.

## 2018-03-17 NOTE — PROVIDER CONTACT NOTE (OTHER) - ACTION/TREATMENT ORDERED:
PA to place new IVL
PA to see and evaluate pt.
awaiting for order
benadryl  ordered
PT Goals( to achieve in 2 weeks);Pt min assist with bed mobility. Pt min assist with transfers.  Pt min assist with amb with RW X 100 feet

## 2018-03-17 NOTE — PROGRESS NOTE ADULT - SUBJECTIVE AND OBJECTIVE BOX
NEPHROLOGY INTERVAL HPI/OVERNIGHT EVENTS:    Examined   Generalized weakness    MEDICATIONS  (STANDING):  ALBUTerol    0.083% 2.5 milliGRAM(s) Nebulizer every 6 hours  ALBUTerol    90 MICROgram(s) HFA Inhaler 1 Puff(s) Inhalation every 4 hours  aspirin enteric coated 81 milliGRAM(s) Oral daily  atorvastatin 80 milliGRAM(s) Oral at bedtime  clindamycin   Capsule 300 milliGRAM(s) Oral four times a day  dextrose 5%. 1000 milliLiter(s) (50 mL/Hr) IV Continuous <Continuous>  dextrose 50% Injectable 12.5 Gram(s) IV Push once  dextrose 50% Injectable 25 Gram(s) IV Push once  dextrose 50% Injectable 25 Gram(s) IV Push once  docusate sodium 100 milliGRAM(s) Oral three times a day  famotidine    Tablet 20 milliGRAM(s) Oral daily  fludroCORTISONE 0.1 milliGRAM(s) Oral two times a day  hydrocortisone 1% Cream 1 Application(s) Topical daily  insulin lispro (HumaLOG) corrective regimen sliding scale   SubCutaneous three times a day before meals  insulin lispro (HumaLOG) corrective regimen sliding scale   SubCutaneous at bedtime  magnesium oxide 400 milliGRAM(s) Oral daily  metoprolol     tartrate 12.5 milliGRAM(s) Oral two times a day  oxybutynin 5 milliGRAM(s) Oral two times a day  polyethylene glycol 3350 17 Gram(s) Oral daily  rivaroxaban 15 milliGRAM(s) Oral every 24 hours  saccharomyces boulardii 250 milliGRAM(s) Oral two times a day  senna 2 Tablet(s) Oral at bedtime    MEDICATIONS  (PRN):  bisacodyl Suppository 10 milliGRAM(s) Rectal daily PRN Constipation  cyclobenzaprine 10 milliGRAM(s) Oral at bedtime PRN Muscle Spasm  dextrose Gel 1 Dose(s) Oral once PRN Blood Glucose LESS THAN 70 milliGRAM(s)/deciliter  diphenhydrAMINE   Capsule 50 milliGRAM(s) Oral every 4 hours PRN Rash and/or Itching  diphenhydrAMINE   Injectable 50 milliGRAM(s) IV Push every 4 hours PRN Rash and/or Itching  glucagon  Injectable 1 milliGRAM(s) IntraMuscular once PRN Glucose LESS THAN 70 milligrams/deciliter  guaiFENesin    Syrup 200 milliGRAM(s) Oral every 6 hours PRN Cough      Allergies    Cipro (Rash)  latex (Rash)    Intolerances        Vital Signs Last 24 Hrs  T(C): 36.4 (16 Mar 2018 23:47), Max: 36.6 (16 Mar 2018 08:15)  T(F): 97.6 (16 Mar 2018 23:47), Max: 97.8 (16 Mar 2018 08:15)  HR: 90 (17 Mar 2018 05:11) (77 - 92)  BP: 112/58 (17 Mar 2018 05:11) (91/48 - 112/58)  BP(mean): --  RR: 18 (16 Mar 2018 23:47) (18 - 18)  SpO2: 98% (16 Mar 2018 23:47) (98% - 99%)  Daily     Daily     PHYSICAL EXAM:  GENERAL: NAD,   HEAD:  No facial edema   NECK: Supple, No JVD,  CHEST/LUNG: EAE , No wheeze   HEART: Regular rate and rhythm; No murmurs, rubs, or gallops  ABDOMEN: Obese , nontender   EXTREMITIES:  chronic edema     LABS:                        11.4   9.2   )-----------( 301      ( 16 Mar 2018 09:21 )             35.5     03-16    137  |  102  |  91.0<H>  ----------------------------<  119<H>  5.0   |  22.0  |  2.29<H>    Ca    8.2<L>      16 Mar 2018 08:07  Phos  4.5     03-16  Mg     1.9     03-16          Magnesium, Serum: 1.9 mg/dL (03-16 @ 09:21)  Phosphorus Level, Serum: 4.5 mg/dL (03-16 @ 09:21)          RADIOLOGY & ADDITIONAL TESTS:

## 2018-03-18 LAB
ANION GAP SERPL CALC-SCNC: 14 MMOL/L — SIGNIFICANT CHANGE UP (ref 5–17)
APPEARANCE UR: ABNORMAL
APPEARANCE UR: CLEAR — SIGNIFICANT CHANGE UP
BACTERIA # UR AUTO: ABNORMAL
BACTERIA # UR AUTO: ABNORMAL
BILIRUB UR-MCNC: NEGATIVE — SIGNIFICANT CHANGE UP
BILIRUB UR-MCNC: NEGATIVE — SIGNIFICANT CHANGE UP
BUN SERPL-MCNC: 67 MG/DL — HIGH (ref 8–20)
CALCIUM SERPL-MCNC: 9.2 MG/DL — SIGNIFICANT CHANGE UP (ref 8.6–10.2)
CHLORIDE SERPL-SCNC: 104 MMOL/L — SIGNIFICANT CHANGE UP (ref 98–107)
CO2 SERPL-SCNC: 24 MMOL/L — SIGNIFICANT CHANGE UP (ref 22–29)
COLOR SPEC: YELLOW — SIGNIFICANT CHANGE UP
COLOR SPEC: YELLOW — SIGNIFICANT CHANGE UP
CREAT SERPL-MCNC: 1.26 MG/DL — SIGNIFICANT CHANGE UP (ref 0.5–1.3)
DIFF PNL FLD: ABNORMAL
DIFF PNL FLD: ABNORMAL
EPI CELLS # UR: SIGNIFICANT CHANGE UP
EPI CELLS # UR: SIGNIFICANT CHANGE UP
GLUCOSE BLDC GLUCOMTR-MCNC: 115 MG/DL — HIGH (ref 70–99)
GLUCOSE BLDC GLUCOMTR-MCNC: 127 MG/DL — HIGH (ref 70–99)
GLUCOSE BLDC GLUCOMTR-MCNC: 173 MG/DL — HIGH (ref 70–99)
GLUCOSE BLDC GLUCOMTR-MCNC: 178 MG/DL — HIGH (ref 70–99)
GLUCOSE SERPL-MCNC: 135 MG/DL — HIGH (ref 70–115)
GLUCOSE UR QL: NEGATIVE MG/DL — SIGNIFICANT CHANGE UP
GLUCOSE UR QL: NEGATIVE MG/DL — SIGNIFICANT CHANGE UP
HCT VFR BLD CALC: 32.9 % — LOW (ref 37–47)
HGB BLD-MCNC: 10.5 G/DL — LOW (ref 12–16)
KETONES UR-MCNC: NEGATIVE — SIGNIFICANT CHANGE UP
KETONES UR-MCNC: NEGATIVE — SIGNIFICANT CHANGE UP
LEUKOCYTE ESTERASE UR-ACNC: ABNORMAL
LEUKOCYTE ESTERASE UR-ACNC: ABNORMAL
MCHC RBC-ENTMCNC: 27.9 PG — SIGNIFICANT CHANGE UP (ref 27–31)
MCHC RBC-ENTMCNC: 31.9 G/DL — LOW (ref 32–36)
MCV RBC AUTO: 87.3 FL — SIGNIFICANT CHANGE UP (ref 81–99)
NITRITE UR-MCNC: NEGATIVE — SIGNIFICANT CHANGE UP
NITRITE UR-MCNC: NEGATIVE — SIGNIFICANT CHANGE UP
PH UR: 5 — SIGNIFICANT CHANGE UP (ref 5–8)
PH UR: 6 — SIGNIFICANT CHANGE UP (ref 5–8)
PLATELET # BLD AUTO: 298 K/UL — SIGNIFICANT CHANGE UP (ref 150–400)
POTASSIUM SERPL-MCNC: 4.3 MMOL/L — SIGNIFICANT CHANGE UP (ref 3.5–5.3)
POTASSIUM SERPL-SCNC: 4.3 MMOL/L — SIGNIFICANT CHANGE UP (ref 3.5–5.3)
PROT UR-MCNC: 15 MG/DL
PROT UR-MCNC: 15 MG/DL
RBC # BLD: 3.77 M/UL — LOW (ref 4.4–5.2)
RBC # FLD: 16 % — HIGH (ref 11–15.6)
RBC CASTS # UR COMP ASSIST: ABNORMAL /HPF (ref 0–4)
RBC CASTS # UR COMP ASSIST: SIGNIFICANT CHANGE UP /HPF (ref 0–4)
SODIUM SERPL-SCNC: 142 MMOL/L — SIGNIFICANT CHANGE UP (ref 135–145)
SP GR SPEC: 1.01 — SIGNIFICANT CHANGE UP (ref 1.01–1.02)
SP GR SPEC: 1.01 — SIGNIFICANT CHANGE UP (ref 1.01–1.02)
UROBILINOGEN FLD QL: NEGATIVE MG/DL — SIGNIFICANT CHANGE UP
UROBILINOGEN FLD QL: NEGATIVE MG/DL — SIGNIFICANT CHANGE UP
WBC # BLD: 8.2 K/UL — SIGNIFICANT CHANGE UP (ref 4.8–10.8)
WBC # FLD AUTO: 8.2 K/UL — SIGNIFICANT CHANGE UP (ref 4.8–10.8)
WBC UR QL: ABNORMAL
WBC UR QL: ABNORMAL

## 2018-03-18 PROCEDURE — 99233 SBSQ HOSP IP/OBS HIGH 50: CPT

## 2018-03-18 RX ORDER — MORPHINE SULFATE 50 MG/1
15 CAPSULE, EXTENDED RELEASE ORAL ONCE
Qty: 0 | Refills: 0 | Status: DISCONTINUED | OUTPATIENT
Start: 2018-03-18 | End: 2018-03-18

## 2018-03-18 RX ORDER — ACETAMINOPHEN 500 MG
650 TABLET ORAL EVERY 6 HOURS
Qty: 0 | Refills: 0 | Status: DISCONTINUED | OUTPATIENT
Start: 2018-03-18 | End: 2018-03-19

## 2018-03-18 RX ORDER — MORPHINE SULFATE 50 MG/1
15 CAPSULE, EXTENDED RELEASE ORAL EVERY 12 HOURS
Qty: 0 | Refills: 0 | Status: DISCONTINUED | OUTPATIENT
Start: 2018-03-18 | End: 2018-03-19

## 2018-03-18 RX ADMIN — Medication 50 MILLIGRAM(S): at 20:52

## 2018-03-18 RX ADMIN — MORPHINE SULFATE 15 MILLIGRAM(S): 50 CAPSULE, EXTENDED RELEASE ORAL at 01:11

## 2018-03-18 RX ADMIN — Medication 100 MILLIGRAM(S): at 05:07

## 2018-03-18 RX ADMIN — ALBUTEROL 2.5 MILLIGRAM(S): 90 AEROSOL, METERED ORAL at 16:32

## 2018-03-18 RX ADMIN — RIVAROXABAN 15 MILLIGRAM(S): KIT at 17:37

## 2018-03-18 RX ADMIN — Medication 300 MILLIGRAM(S): at 17:37

## 2018-03-18 RX ADMIN — Medication 1 APPLICATION(S): at 12:30

## 2018-03-18 RX ADMIN — MORPHINE SULFATE 15 MILLIGRAM(S): 50 CAPSULE, EXTENDED RELEASE ORAL at 22:06

## 2018-03-18 RX ADMIN — MORPHINE SULFATE 15 MILLIGRAM(S): 50 CAPSULE, EXTENDED RELEASE ORAL at 21:11

## 2018-03-18 RX ADMIN — MAGNESIUM OXIDE 400 MG ORAL TABLET 400 MILLIGRAM(S): 241.3 TABLET ORAL at 12:04

## 2018-03-18 RX ADMIN — SENNA PLUS 2 TABLET(S): 8.6 TABLET ORAL at 21:11

## 2018-03-18 RX ADMIN — ALBUTEROL 2.5 MILLIGRAM(S): 90 AEROSOL, METERED ORAL at 03:34

## 2018-03-18 RX ADMIN — Medication 5 MILLIGRAM(S): at 05:07

## 2018-03-18 RX ADMIN — Medication 5 MILLIGRAM(S): at 17:37

## 2018-03-18 RX ADMIN — Medication 81 MILLIGRAM(S): at 12:04

## 2018-03-18 RX ADMIN — Medication 300 MILLIGRAM(S): at 12:05

## 2018-03-18 RX ADMIN — Medication 100 MILLIGRAM(S): at 21:11

## 2018-03-18 RX ADMIN — Medication 250 MILLIGRAM(S): at 05:07

## 2018-03-18 RX ADMIN — ALBUTEROL 2.5 MILLIGRAM(S): 90 AEROSOL, METERED ORAL at 20:18

## 2018-03-18 RX ADMIN — Medication 300 MILLIGRAM(S): at 05:07

## 2018-03-18 RX ADMIN — FLUDROCORTISONE ACETATE 0.1 MILLIGRAM(S): 0.1 TABLET ORAL at 18:11

## 2018-03-18 RX ADMIN — FAMOTIDINE 20 MILLIGRAM(S): 10 INJECTION INTRAVENOUS at 12:04

## 2018-03-18 RX ADMIN — MORPHINE SULFATE 15 MILLIGRAM(S): 50 CAPSULE, EXTENDED RELEASE ORAL at 00:11

## 2018-03-18 RX ADMIN — ATORVASTATIN CALCIUM 80 MILLIGRAM(S): 80 TABLET, FILM COATED ORAL at 21:11

## 2018-03-18 RX ADMIN — ALBUTEROL 2.5 MILLIGRAM(S): 90 AEROSOL, METERED ORAL at 09:25

## 2018-03-18 RX ADMIN — Medication 12.5 MILLIGRAM(S): at 05:07

## 2018-03-18 RX ADMIN — FLUDROCORTISONE ACETATE 0.1 MILLIGRAM(S): 0.1 TABLET ORAL at 05:07

## 2018-03-18 RX ADMIN — Medication 250 MILLIGRAM(S): at 17:37

## 2018-03-18 RX ADMIN — Medication 100 MILLIGRAM(S): at 15:04

## 2018-03-18 RX ADMIN — Medication 1: at 17:35

## 2018-03-18 NOTE — PROGRESS NOTE ADULT - PROBLEM SELECTOR PLAN 3
Continue ASA, Statin, BB. Ace inhibitor discontinues secondary to renal failure, will resume once kidney function is better.

## 2018-03-18 NOTE — PROGRESS NOTE ADULT - ASSESSMENT
82 year old female with PMH HTN, DMT2, Dyslipidemia, CAD s/p PCI, atrial fib, Chronic pain presents to [facility] after falling at home, after her legs gave way upon arising from bed. Examination significant for Right periorbital ecchymosis, patient UA was significant for infection completed the course of antibiotics, investigations unremarkable except for right periorbital hematoma, Mild dehydration on renal indices on admission, On 3/12 her lower extremity are red hot swollen, treatment initiated for cellulitis. On 3/13 Antibiotics were changed to clindamycin secondary to rash from kefzol and Nephrology consulted for elevated creatinine, her rash continues to bother her and her creatinine continues to climb, 3/15 Renal function stabilizing as creatinine is slightly lower, Renal input appreciated, add florinef for hypotension and hyperkalemia, her potassium is also normalized.

## 2018-03-18 NOTE — PROGRESS NOTE ADULT - PROBLEM SELECTOR PLAN 8
Nephrology input appreciated, Possible allergy to kefzol?, Gentle hydration, avoid nephrotoxins, Added florinef, bun and creatinine is improving, will continue to monitor BMP along with I/Os

## 2018-03-18 NOTE — PROVIDER CONTACT NOTE (MEDICATION) - SITUATION
MD changed pain medication to tylenol from morphine ER. spoke with pt, pt states that she takes medication at home prescribed by pain management.

## 2018-03-18 NOTE — PROGRESS NOTE ADULT - PROBLEM SELECTOR PLAN 7
Patient is allergic to kefzol, swith to po clinda for 7 days.  Add benadry for rash. Last day of clinda is 3/17. Patient is allergic to kefzol, swith to po clinda for 7 days.  Add benadry for rash. Last day of clinda is 3/17, redness and swelling is looking better.

## 2018-03-18 NOTE — PROGRESS NOTE ADULT - PROBLEM SELECTOR PLAN 1
Urine culture negative. 3/12 Patient has completed regimen of Rocephin for uti. Urine culture negative. 3/12 Patient has completed regimen of Rocephin for uti, repeat UA showed some WBCs, no symptoms, will repeat UA and urine cultures.

## 2018-03-18 NOTE — PROGRESS NOTE ADULT - SUBJECTIVE AND OBJECTIVE BOX
FLEX MORALES    [id number]    82y      Female    Patient is a 82y old  Female who presents with a chief complaint of Trauma B s/p fall from bed on Xarelto (15 Mar 2018 09:00)      INTERVAL HPI/OVERNIGHT EVENTS:    Patient's redness of the lower ext is improving, no worsening, has no fever, denies chills, chest pain, nausea, vomiting.     REVIEW OF SYSTEMS:    CONSTITUTIONAL: No fever, some fatigue  RESPIRATORY: No cough, No shortness of breath  CARDIOVASCULAR: No chest pain, palpitations  GASTROINTESTINAL: No abdominal, No nausea, vomiting  NEUROLOGICAL: No headaches,  loss of strength.  MISCELLANEOUS: No joint swelling or pain       Vital Signs Last 24 Hrs  T(C): 36.6 (18 Mar 2018 07:15), Max: 36.9 (18 Mar 2018 00:16)  T(F): 97.8 (18 Mar 2018 07:15), Max: 98.4 (18 Mar 2018 00:16)  HR: 68 (18 Mar 2018 07:15) (68 - 90)  BP: 124/60 (18 Mar 2018 07:15) (102/62 - 124/60)  BP(mean): --  RR: 18 (18 Mar 2018 07:15) (18 - 18)  SpO2: 97% (18 Mar 2018 07:15) (96% - 97%)    PHYSICAL EXAM:    GENERAL: Elderly female looking comfortable  HEENT: PERRL, +EOMI  NECK: soft, Supple, No JVD,   CHEST/LUNG: Decrease air entry bilaterally; No wheezing  HEART: S1S2+, Regular rate and rhythm; No murmurs  ABDOMEN: Soft, Nontender, Nondistended; Bowel sounds present  EXTREMITIES:  1+ Peripheral Pulses, some edema  SKIN:  Redness of the both lower ext more on the right leg   NEURO: AAOX3, no focal deficits, no motor r sensory loss  PSYCH: normal mood      LABS:                        10.5   8.2   )-----------( 298      ( 18 Mar 2018 07:17 )             32.9     03-18    142  |  104  |  67.0<H>  ----------------------------<  135<H>  4.3   |  24.0  |  1.26    Ca    9.2      18 Mar 2018 07:17        Urinalysis Basic - ( 18 Mar 2018 00:27 )    Color: Yellow / Appearance: Slightly Turbid / S.015 / pH: x  Gluc: x / Ketone: Negative  / Bili: Negative / Urobili: Negative mg/dL   Blood: x / Protein: 15 mg/dL / Nitrite: Negative   Leuk Esterase: Moderate / RBC: 0-2 /HPF / WBC 6-10   Sq Epi: x / Non Sq Epi: Few / Bacteria: Moderate          I&O's Summary    17 Mar 2018 07:01  -  18 Mar 2018 07:00  --------------------------------------------------------  IN: 0 mL / OUT: 5 mL / NET: -5 mL        MEDICATIONS  (STANDING):  ALBUTerol    0.083% 2.5 milliGRAM(s) Nebulizer every 6 hours  ALBUTerol    90 MICROgram(s) HFA Inhaler 1 Puff(s) Inhalation every 4 hours  aspirin enteric coated 81 milliGRAM(s) Oral daily  atorvastatin 80 milliGRAM(s) Oral at bedtime  clindamycin   Capsule 300 milliGRAM(s) Oral four times a day  dextrose 5%. 1000 milliLiter(s) (50 mL/Hr) IV Continuous <Continuous>  dextrose 50% Injectable 12.5 Gram(s) IV Push once  dextrose 50% Injectable 25 Gram(s) IV Push once  dextrose 50% Injectable 25 Gram(s) IV Push once  docusate sodium 100 milliGRAM(s) Oral three times a day  famotidine    Tablet 20 milliGRAM(s) Oral daily  fludroCORTISONE 0.1 milliGRAM(s) Oral two times a day  hydrocortisone 1% Cream 1 Application(s) Topical daily  insulin lispro (HumaLOG) corrective regimen sliding scale   SubCutaneous three times a day before meals  insulin lispro (HumaLOG) corrective regimen sliding scale   SubCutaneous at bedtime  magnesium oxide 400 milliGRAM(s) Oral daily  metoprolol     tartrate 12.5 milliGRAM(s) Oral two times a day  oxybutynin 5 milliGRAM(s) Oral two times a day  polyethylene glycol 3350 17 Gram(s) Oral daily  rivaroxaban 15 milliGRAM(s) Oral every 24 hours  saccharomyces boulardii 250 milliGRAM(s) Oral two times a day  senna 2 Tablet(s) Oral at bedtime    MEDICATIONS  (PRN):  acetaminophen   Tablet 650 milliGRAM(s) Oral every 6 hours PRN Pain, fever, headache  bisacodyl Suppository 10 milliGRAM(s) Rectal daily PRN Constipation  cyclobenzaprine 10 milliGRAM(s) Oral at bedtime PRN Muscle Spasm  dextrose Gel 1 Dose(s) Oral once PRN Blood Glucose LESS THAN 70 milliGRAM(s)/deciliter  diphenhydrAMINE   Capsule 50 milliGRAM(s) Oral every 4 hours PRN Rash and/or Itching  diphenhydrAMINE   Injectable 50 milliGRAM(s) IV Push every 4 hours PRN Rash and/or Itching  glucagon  Injectable 1 milliGRAM(s) IntraMuscular once PRN Glucose LESS THAN 70 milligrams/deciliter  guaiFENesin    Syrup 200 milliGRAM(s) Oral every 6 hours PRN Cough FLEX MORALES    [id number]    82y      Female    Patient is a 82y old  Female who presents with a chief complaint of Trauma B s/p fall from bed on Xarelto (15 Mar 2018 09:00)      INTERVAL HPI/OVERNIGHT EVENTS:    Patient's redness of the lower ext is better, pain is better as well, has no fever, denies chills, chest pain, nausea, vomiting.     REVIEW OF SYSTEMS:    CONSTITUTIONAL: No fever, some fatigue  RESPIRATORY: No cough, No shortness of breath  CARDIOVASCULAR: No chest pain, palpitations  GASTROINTESTINAL: No abdominal, No nausea, vomiting  NEUROLOGICAL: No headaches,  loss of strength.  MISCELLANEOUS: No joint swelling or pain       Vital Signs Last 24 Hrs  T(C): 36.6 (18 Mar 2018 07:15), Max: 36.9 (18 Mar 2018 00:16)  T(F): 97.8 (18 Mar 2018 07:15), Max: 98.4 (18 Mar 2018 00:16)  HR: 68 (18 Mar 2018 07:15) (68 - 90)  BP: 124/60 (18 Mar 2018 07:15) (102/62 - 124/60)  RR: 18 (18 Mar 2018 07:15) (18 - 18)  SpO2: 97% (18 Mar 2018 07:15) (96% - 97%)    PHYSICAL EXAM:    GENERAL: Elderly female looking comfortable  HEENT: PERRL, +EOMI, Echyomosis around the right eye is improving, has some swelling on the right temporal area.   NECK: soft, Supple, No JVD,   CHEST/LUNG: Decrease air entry bilaterally; No wheezing  HEART: S1S2+, Regular rate and rhythm; No murmurs  ABDOMEN: Soft, Nontender, Nondistended; Bowel sounds present  EXTREMITIES:  1+ Peripheral Pulses, some edema  SKIN:  Redness of the both lower ext more on the right leg, looks better then before.    NEURO: AAOX3, no focal deficits, no motor r sensory loss  PSYCH: normal mood      LABS:                        10.5   8.2   )-----------( 298      ( 18 Mar 2018 07:17 )             32.9     -    142  |  104  |  67.0<H>  ----------------------------<  135<H>  4.3   |  24.0  |  1.26    Ca    9.2      18 Mar 2018 07:17        Urinalysis Basic - ( 18 Mar 2018 00:27 )    Color: Yellow / Appearance: Slightly Turbid / S.015 / pH: x  Gluc: x / Ketone: Negative  / Bili: Negative / Urobili: Negative mg/dL   Blood: x / Protein: 15 mg/dL / Nitrite: Negative   Leuk Esterase: Moderate / RBC: 0-2 /HPF / WBC 6-10   Sq Epi: x / Non Sq Epi: Few / Bacteria: Moderate          I&O's Summary    17 Mar 2018 07:01  -  18 Mar 2018 07:00  --------------------------------------------------------  IN: 0 mL / OUT: 5 mL / NET: -5 mL        MEDICATIONS  (STANDING):  ALBUTerol    0.083% 2.5 milliGRAM(s) Nebulizer every 6 hours  ALBUTerol    90 MICROgram(s) HFA Inhaler 1 Puff(s) Inhalation every 4 hours  aspirin enteric coated 81 milliGRAM(s) Oral daily  atorvastatin 80 milliGRAM(s) Oral at bedtime  clindamycin   Capsule 300 milliGRAM(s) Oral four times a day  dextrose 5%. 1000 milliLiter(s) (50 mL/Hr) IV Continuous <Continuous>  dextrose 50% Injectable 12.5 Gram(s) IV Push once  dextrose 50% Injectable 25 Gram(s) IV Push once  dextrose 50% Injectable 25 Gram(s) IV Push once  docusate sodium 100 milliGRAM(s) Oral three times a day  famotidine    Tablet 20 milliGRAM(s) Oral daily  fludroCORTISONE 0.1 milliGRAM(s) Oral two times a day  hydrocortisone 1% Cream 1 Application(s) Topical daily  insulin lispro (HumaLOG) corrective regimen sliding scale   SubCutaneous three times a day before meals  insulin lispro (HumaLOG) corrective regimen sliding scale   SubCutaneous at bedtime  magnesium oxide 400 milliGRAM(s) Oral daily  metoprolol     tartrate 12.5 milliGRAM(s) Oral two times a day  oxybutynin 5 milliGRAM(s) Oral two times a day  polyethylene glycol 3350 17 Gram(s) Oral daily  rivaroxaban 15 milliGRAM(s) Oral every 24 hours  saccharomyces boulardii 250 milliGRAM(s) Oral two times a day  senna 2 Tablet(s) Oral at bedtime    MEDICATIONS  (PRN):  acetaminophen   Tablet 650 milliGRAM(s) Oral every 6 hours PRN Pain, fever, headache  bisacodyl Suppository 10 milliGRAM(s) Rectal daily PRN Constipation  cyclobenzaprine 10 milliGRAM(s) Oral at bedtime PRN Muscle Spasm  dextrose Gel 1 Dose(s) Oral once PRN Blood Glucose LESS THAN 70 milliGRAM(s)/deciliter  diphenhydrAMINE   Capsule 50 milliGRAM(s) Oral every 4 hours PRN Rash and/or Itching  diphenhydrAMINE   Injectable 50 milliGRAM(s) IV Push every 4 hours PRN Rash and/or Itching  glucagon  Injectable 1 milliGRAM(s) IntraMuscular once PRN Glucose LESS THAN 70 milligrams/deciliter  guaiFENesin    Syrup 200 milliGRAM(s) Oral every 6 hours PRN Cough

## 2018-03-19 VITALS
TEMPERATURE: 98 F | HEART RATE: 80 BPM | RESPIRATION RATE: 18 BRPM | SYSTOLIC BLOOD PRESSURE: 132 MMHG | DIASTOLIC BLOOD PRESSURE: 55 MMHG | OXYGEN SATURATION: 99 %

## 2018-03-19 LAB
ANION GAP SERPL CALC-SCNC: 8 MMOL/L — SIGNIFICANT CHANGE UP (ref 5–17)
BUN SERPL-MCNC: 45 MG/DL — HIGH (ref 8–20)
CALCIUM SERPL-MCNC: 8.8 MG/DL — SIGNIFICANT CHANGE UP (ref 8.6–10.2)
CHLORIDE SERPL-SCNC: 107 MMOL/L — SIGNIFICANT CHANGE UP (ref 98–107)
CO2 SERPL-SCNC: 27 MMOL/L — SIGNIFICANT CHANGE UP (ref 22–29)
CREAT SERPL-MCNC: 0.87 MG/DL — SIGNIFICANT CHANGE UP (ref 0.5–1.3)
CULTURE RESULTS: SIGNIFICANT CHANGE UP
GLUCOSE BLDC GLUCOMTR-MCNC: 104 MG/DL — HIGH (ref 70–99)
GLUCOSE BLDC GLUCOMTR-MCNC: 124 MG/DL — HIGH (ref 70–99)
GLUCOSE BLDC GLUCOMTR-MCNC: 170 MG/DL — HIGH (ref 70–99)
GLUCOSE SERPL-MCNC: 105 MG/DL — SIGNIFICANT CHANGE UP (ref 70–115)
HCT VFR BLD CALC: 29.5 % — LOW (ref 37–47)
HGB BLD-MCNC: 9.4 G/DL — LOW (ref 12–16)
MCHC RBC-ENTMCNC: 28.3 PG — SIGNIFICANT CHANGE UP (ref 27–31)
MCHC RBC-ENTMCNC: 31.9 G/DL — LOW (ref 32–36)
MCV RBC AUTO: 88.9 FL — SIGNIFICANT CHANGE UP (ref 81–99)
PLATELET # BLD AUTO: 203 K/UL — SIGNIFICANT CHANGE UP (ref 150–400)
POTASSIUM SERPL-MCNC: 4.8 MMOL/L — SIGNIFICANT CHANGE UP (ref 3.5–5.3)
POTASSIUM SERPL-SCNC: 4.8 MMOL/L — SIGNIFICANT CHANGE UP (ref 3.5–5.3)
RBC # BLD: 3.32 M/UL — LOW (ref 4.4–5.2)
RBC # FLD: 16.2 % — HIGH (ref 11–15.6)
SODIUM SERPL-SCNC: 142 MMOL/L — SIGNIFICANT CHANGE UP (ref 135–145)
SPECIMEN SOURCE: SIGNIFICANT CHANGE UP
WBC # BLD: 5.8 K/UL — SIGNIFICANT CHANGE UP (ref 4.8–10.8)
WBC # FLD AUTO: 5.8 K/UL — SIGNIFICANT CHANGE UP (ref 4.8–10.8)

## 2018-03-19 PROCEDURE — 73630 X-RAY EXAM OF FOOT: CPT

## 2018-03-19 PROCEDURE — 86901 BLOOD TYPING SEROLOGIC RH(D): CPT

## 2018-03-19 PROCEDURE — 85027 COMPLETE CBC AUTOMATED: CPT

## 2018-03-19 PROCEDURE — 86900 BLOOD TYPING SEROLOGIC ABO: CPT

## 2018-03-19 PROCEDURE — 99239 HOSP IP/OBS DSCHRG MGMT >30: CPT

## 2018-03-19 PROCEDURE — 97116 GAIT TRAINING THERAPY: CPT

## 2018-03-19 PROCEDURE — 93306 TTE W/DOPPLER COMPLETE: CPT

## 2018-03-19 PROCEDURE — 86850 RBC ANTIBODY SCREEN: CPT

## 2018-03-19 PROCEDURE — 80048 BASIC METABOLIC PNL TOTAL CA: CPT

## 2018-03-19 PROCEDURE — 84100 ASSAY OF PHOSPHORUS: CPT

## 2018-03-19 PROCEDURE — 97530 THERAPEUTIC ACTIVITIES: CPT

## 2018-03-19 PROCEDURE — 84300 ASSAY OF URINE SODIUM: CPT

## 2018-03-19 PROCEDURE — 82962 GLUCOSE BLOOD TEST: CPT

## 2018-03-19 PROCEDURE — 71045 X-RAY EXAM CHEST 1 VIEW: CPT

## 2018-03-19 PROCEDURE — 83935 ASSAY OF URINE OSMOLALITY: CPT

## 2018-03-19 PROCEDURE — 93005 ELECTROCARDIOGRAM TRACING: CPT

## 2018-03-19 PROCEDURE — 83735 ASSAY OF MAGNESIUM: CPT

## 2018-03-19 PROCEDURE — 87086 URINE CULTURE/COLONY COUNT: CPT

## 2018-03-19 PROCEDURE — 87205 SMEAR GRAM STAIN: CPT

## 2018-03-19 PROCEDURE — 99285 EMERGENCY DEPT VISIT HI MDM: CPT | Mod: 25

## 2018-03-19 PROCEDURE — 84133 ASSAY OF URINE POTASSIUM: CPT

## 2018-03-19 PROCEDURE — 70450 CT HEAD/BRAIN W/O DYE: CPT

## 2018-03-19 PROCEDURE — 36415 COLL VENOUS BLD VENIPUNCTURE: CPT

## 2018-03-19 PROCEDURE — 76775 US EXAM ABDO BACK WALL LIM: CPT

## 2018-03-19 PROCEDURE — 80061 LIPID PANEL: CPT

## 2018-03-19 PROCEDURE — 73502 X-RAY EXAM HIP UNI 2-3 VIEWS: CPT

## 2018-03-19 PROCEDURE — 80053 COMPREHEN METABOLIC PANEL: CPT

## 2018-03-19 PROCEDURE — 81001 URINALYSIS AUTO W/SCOPE: CPT

## 2018-03-19 PROCEDURE — 85610 PROTHROMBIN TIME: CPT

## 2018-03-19 PROCEDURE — 85048 AUTOMATED LEUKOCYTE COUNT: CPT

## 2018-03-19 PROCEDURE — 82550 ASSAY OF CK (CPK): CPT

## 2018-03-19 PROCEDURE — 82553 CREATINE MB FRACTION: CPT

## 2018-03-19 PROCEDURE — 72125 CT NECK SPINE W/O DYE: CPT

## 2018-03-19 PROCEDURE — 87186 SC STD MICRODIL/AGAR DIL: CPT

## 2018-03-19 PROCEDURE — 97110 THERAPEUTIC EXERCISES: CPT

## 2018-03-19 PROCEDURE — 83036 HEMOGLOBIN GLYCOSYLATED A1C: CPT

## 2018-03-19 PROCEDURE — 94640 AIRWAY INHALATION TREATMENT: CPT

## 2018-03-19 PROCEDURE — 73560 X-RAY EXAM OF KNEE 1 OR 2: CPT

## 2018-03-19 RX ORDER — SITAGLIPTIN 50 MG/1
1 TABLET, FILM COATED ORAL
Qty: 30 | Refills: 0 | OUTPATIENT
Start: 2018-03-19 | End: 2018-04-17

## 2018-03-19 RX ORDER — DIPHENHYDRAMINE HCL 50 MG
1 CAPSULE ORAL
Qty: 0 | Refills: 0 | COMMUNITY
Start: 2018-03-19

## 2018-03-19 RX ORDER — RANITIDINE HYDROCHLORIDE 150 MG/1
1 TABLET, FILM COATED ORAL
Qty: 30 | Refills: 0 | OUTPATIENT
Start: 2018-03-19 | End: 2018-04-17

## 2018-03-19 RX ORDER — BUMETANIDE 0.25 MG/ML
1 INJECTION INTRAMUSCULAR; INTRAVENOUS
Qty: 30 | Refills: 0 | OUTPATIENT
Start: 2018-03-19 | End: 2018-04-17

## 2018-03-19 RX ORDER — MORPHINE SULFATE 50 MG/1
1 CAPSULE, EXTENDED RELEASE ORAL
Qty: 0 | Refills: 0 | COMMUNITY
Start: 2018-03-19

## 2018-03-19 RX ORDER — RAMIPRIL 5 MG
1 CAPSULE ORAL
Qty: 30 | Refills: 0 | OUTPATIENT
Start: 2018-03-19 | End: 2018-04-17

## 2018-03-19 RX ADMIN — FLUDROCORTISONE ACETATE 0.1 MILLIGRAM(S): 0.1 TABLET ORAL at 05:38

## 2018-03-19 RX ADMIN — Medication 81 MILLIGRAM(S): at 12:13

## 2018-03-19 RX ADMIN — ALBUTEROL 2.5 MILLIGRAM(S): 90 AEROSOL, METERED ORAL at 15:21

## 2018-03-19 RX ADMIN — POLYETHYLENE GLYCOL 3350 17 GRAM(S): 17 POWDER, FOR SOLUTION ORAL at 12:13

## 2018-03-19 RX ADMIN — ALBUTEROL 2.5 MILLIGRAM(S): 90 AEROSOL, METERED ORAL at 09:06

## 2018-03-19 RX ADMIN — Medication 100 MILLIGRAM(S): at 05:38

## 2018-03-19 RX ADMIN — Medication 1 APPLICATION(S): at 12:13

## 2018-03-19 RX ADMIN — Medication 12.5 MILLIGRAM(S): at 05:39

## 2018-03-19 RX ADMIN — Medication 12.5 MILLIGRAM(S): at 17:13

## 2018-03-19 RX ADMIN — Medication 5 MILLIGRAM(S): at 17:13

## 2018-03-19 RX ADMIN — FAMOTIDINE 20 MILLIGRAM(S): 10 INJECTION INTRAVENOUS at 12:13

## 2018-03-19 RX ADMIN — Medication 5 MILLIGRAM(S): at 05:38

## 2018-03-19 RX ADMIN — Medication 250 MILLIGRAM(S): at 05:39

## 2018-03-19 RX ADMIN — Medication 1: at 17:09

## 2018-03-19 RX ADMIN — MAGNESIUM OXIDE 400 MG ORAL TABLET 400 MILLIGRAM(S): 241.3 TABLET ORAL at 12:13

## 2018-03-19 RX ADMIN — Medication 100 MILLIGRAM(S): at 12:13

## 2018-03-19 RX ADMIN — Medication 250 MILLIGRAM(S): at 17:13

## 2018-03-19 NOTE — PROGRESS NOTE ADULT - ASSESSMENT
A: left hallux ulceration    P:  Patient evaluated and chart reviewed  Wound appears stable and uninfected  will benefit from bed side debridement.   x-rays reviewed No   Podiatry will follow and monitor while in house

## 2018-03-19 NOTE — PROGRESS NOTE ADULT - SUBJECTIVE AND OBJECTIVE BOX
Patient is a 82y old  Female seen and examined at bedside for left great toe pain. Patient states it is painful if something touches it.     Vital Signs Last 24 Hrs  T(C): 36.3 (19 Mar 2018 08:02), Max: 36.8 (19 Mar 2018 00:18)  T(F): 97.4 (19 Mar 2018 08:02), Max: 98.3 (19 Mar 2018 00:18)  HR: 78 (19 Mar 2018 08:02) (73 - 84)  BP: 122/64 (19 Mar 2018 08:02) (111/50 - 135/70)  BP(mean): --  RR: 18 (19 Mar 2018 08:02) (18 - 18)  SpO2: 97% (19 Mar 2018 08:02) (92% - 97%)    LABS                 03-19    142  |  107  |  45.0<H>  ----------------------------<  105  4.8   |  27.0  |  0.87    Ca    8.8      19 Mar 2018 06:26                          9.4    5.8   )-----------( 203      ( 19 Mar 2018 06:26 )             29.5       ROS  REVIEW OF SYSTEMS:    CONSTITUTIONAL: No fever, weight loss, or fatigue  EYES: No eye pain, visual disturbances, or discharge  ENMT:  No difficulty hearing, tinnitus, vertigo; No sinus or throat pain  NECK: No pain or stiffness  BREASTS: No pain, masses, or nipple discharge  RESPIRATORY: No cough, wheezing, chills or hemoptysis; No shortness of breath  CARDIOVASCULAR: No chest pain, palpitations, dizziness, or leg swelling  GASTROINTESTINAL: No abdominal or epigastric pain. No nausea, vomiting, or hematemesis; No diarrhea or constipation. No melena or hematochezia.  GENITOURINARY: No dysuria, frequency, hematuria, or incontinence  NEUROLOGICAL: No headaches, memory loss, loss of strength, numbness, or tremors  SKIN: No itching, burning, rashes, left hallux ulcer, painful to palpation  LYMPH NODES: No enlarged glands  ENDOCRINE: No heat or cold intolerance; No hair loss  MUSCULOSKELETAL: No joint pain or swelling; No muscle, back, or extremity pain  PSYCHIATRIC: No depression, anxiety, mood swings, or difficulty sleeping  HEME/LYMPH: No easy bruising, or bleeding gums  ALLERY AND IMMUNOLOGIC: No hives or eczema      PHYSICAL EXAM  GEN: FLEX MORALES is a pleasant well-nourished, well developed 82y Female in no acute distress, alert awake, and oriented to person, place and time.   LE Focused:    Vasc:  pedal pulses nonpalpable; TG wnl; CFT < 3 sec to all digits  Derm: left distal hallux superficial ulcer with dry sanguinous drainage, hyperkeratotic base, no underlying deep ulceration, no probing or tunneling, no purulence or drainage; hyperpigmented venous stasis changes to B/L LE, no open lesions to legs   Neuro: protective sensation intact at level of digits    Imaging:       EXAM: FOOT-LEFT     PROCEDURE DATE: 03/15/2018     INTERPRETATION: LEFT FOOT X-RAY:     History: Left foot ulcer. Evaluate for osteomyelitis..     Date and time of exam: 3/15/2018 4:18 PM     3 views were obtained.     Findings: Extensive vascular calcifications. No evidence of byron bone   destruction. No evidence of fracture or dislocation. Generalized   osteoporosis noted..     Impression:     No radiographic evidence of osteomyelitis.     TISH DONG M.D., ATTENDING RADIOLOGIST

## 2018-03-19 NOTE — PROGRESS NOTE ADULT - ASSESSMENT
PRUDENCE with rash - AIN vs ATN cr trending down resolved cr 0.8 today  CKD  suspect 2/2 DM A1C 7.4  No hydro on renal sono  HyperKalemia  better  cont Florinef for low BP high K

## 2018-03-19 NOTE — PROGRESS NOTE ADULT - SUBJECTIVE AND OBJECTIVE BOX
NEPHROLOGY INTERVAL HPI/OVERNIGHT EVENTS:    Examined earlier    MEDICATIONS  (STANDING):  ALBUTerol    0.083% 2.5 milliGRAM(s) Nebulizer every 6 hours  ALBUTerol    90 MICROgram(s) HFA Inhaler 1 Puff(s) Inhalation every 4 hours  aspirin enteric coated 81 milliGRAM(s) Oral daily  atorvastatin 80 milliGRAM(s) Oral at bedtime  dextrose 5%. 1000 milliLiter(s) (50 mL/Hr) IV Continuous <Continuous>  dextrose 50% Injectable 12.5 Gram(s) IV Push once  dextrose 50% Injectable 25 Gram(s) IV Push once  dextrose 50% Injectable 25 Gram(s) IV Push once  docusate sodium 100 milliGRAM(s) Oral three times a day  famotidine    Tablet 20 milliGRAM(s) Oral daily  fludroCORTISONE 0.1 milliGRAM(s) Oral two times a day  hydrocortisone 1% Cream 1 Application(s) Topical daily  insulin lispro (HumaLOG) corrective regimen sliding scale   SubCutaneous three times a day before meals  insulin lispro (HumaLOG) corrective regimen sliding scale   SubCutaneous at bedtime  magnesium oxide 400 milliGRAM(s) Oral daily  metoprolol     tartrate 12.5 milliGRAM(s) Oral two times a day  oxybutynin 5 milliGRAM(s) Oral two times a day  polyethylene glycol 3350 17 Gram(s) Oral daily  rivaroxaban 15 milliGRAM(s) Oral every 24 hours  saccharomyces boulardii 250 milliGRAM(s) Oral two times a day  senna 2 Tablet(s) Oral at bedtime    MEDICATIONS  (PRN):  acetaminophen   Tablet 650 milliGRAM(s) Oral every 6 hours PRN Pain, fever, headache  bisacodyl Suppository 10 milliGRAM(s) Rectal daily PRN Constipation  cyclobenzaprine 10 milliGRAM(s) Oral at bedtime PRN Muscle Spasm  dextrose Gel 1 Dose(s) Oral once PRN Blood Glucose LESS THAN 70 milliGRAM(s)/deciliter  diphenhydrAMINE   Capsule 50 milliGRAM(s) Oral every 4 hours PRN Rash and/or Itching  diphenhydrAMINE   Injectable 50 milliGRAM(s) IV Push every 4 hours PRN Rash and/or Itching  glucagon  Injectable 1 milliGRAM(s) IntraMuscular once PRN Glucose LESS THAN 70 milligrams/deciliter  guaiFENesin    Syrup 200 milliGRAM(s) Oral every 6 hours PRN Cough  morphine ER Tablet 15 milliGRAM(s) Oral every 12 hours PRN Moderate to sever pain      Allergies    Cipro (Rash)  latex (Rash)    Intolerances        Vital Signs Last 24 Hrs  T(C): 36.3 (19 Mar 2018 08:02), Max: 36.8 (19 Mar 2018 00:18)  T(F): 97.4 (19 Mar 2018 08:02), Max: 98.3 (19 Mar 2018 00:18)  HR: 78 (19 Mar 2018 08:02) (73 - 84)  BP: 122/64 (19 Mar 2018 08:02) (111/50 - 135/70)  BP(mean): --  RR: 18 (19 Mar 2018 08:02) (18 - 18)  SpO2: 97% (19 Mar 2018 08:02) (92% - 97%)  Daily     Daily     PHYSICAL EXAM:  GENERAL: NAD,   HEAD:  No facial edema   NECK: Supple, No JVD,  CHEST/LUNG: EAE , No wheeze   HEART: Regular rate and rhythm; No murmurs, rubs, or gallops  ABDOMEN: Obese , nontender   EXTREMITIES:  chronic edema     LABS:                        9.4    5.8   )-----------( 203      ( 19 Mar 2018 06:26 )             29.5     03-19    142  |  107  |  45.0<H>  ----------------------------<  105  4.8   |  27.0  |  0.87    Ca    8.8      19 Mar 2018 06:26        Urinalysis Basic - ( 18 Mar 2018 15:57 )    Color: Yellow / Appearance: Cear / S.015 / pH: x  Gluc: x / Ketone: Negative  / Bili: Negative / Urobili: Negative mg/dL   Blood: x / Protein: 15 mg/dL / Nitrite: Negative   Leuk Esterase: Moderate / RBC: 3-5 /HPF / WBC 6-10   Sq Epi: x / Non Sq Epi: Occasional / Bacteria: Occasional              RADIOLOGY & ADDITIONAL TESTS:

## 2018-03-20 LAB
-  AMPICILLIN: SIGNIFICANT CHANGE UP
-  CIPROFLOXACIN: SIGNIFICANT CHANGE UP
-  NITROFURANTOIN: SIGNIFICANT CHANGE UP
-  TETRACYCLINE: SIGNIFICANT CHANGE UP
-  VANCOMYCIN: SIGNIFICANT CHANGE UP
CULTURE RESULTS: SIGNIFICANT CHANGE UP
METHOD TYPE: SIGNIFICANT CHANGE UP
ORGANISM # SPEC MICROSCOPIC CNT: SIGNIFICANT CHANGE UP
ORGANISM # SPEC MICROSCOPIC CNT: SIGNIFICANT CHANGE UP
SPECIMEN SOURCE: SIGNIFICANT CHANGE UP

## 2019-01-20 ENCOUNTER — INPATIENT (INPATIENT)
Facility: HOSPITAL | Age: 84
LOS: 4 days | Discharge: NURSING, REHAB OR LONG-TERM CARE | DRG: 603 | End: 2019-01-25
Attending: INTERNAL MEDICINE | Admitting: INTERNAL MEDICINE
Payer: MEDICARE

## 2019-01-20 VITALS
HEIGHT: 65 IN | WEIGHT: 257.94 LBS | RESPIRATION RATE: 16 BRPM | DIASTOLIC BLOOD PRESSURE: 79 MMHG | SYSTOLIC BLOOD PRESSURE: 136 MMHG | HEART RATE: 71 BPM | OXYGEN SATURATION: 99 % | TEMPERATURE: 98 F
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
## 2019-01-20 DIAGNOSIS — Z96.653 PRESENCE OF ARTIFICIAL KNEE JOINT, BILATERAL: Chronic | ICD-10-CM

## 2019-01-20 DIAGNOSIS — Z96.649 PRESENCE OF UNSPECIFIED ARTIFICIAL HIP JOINT: Chronic | ICD-10-CM

## 2019-01-20 PROBLEM — Z79.01 LONG TERM (CURRENT) USE OF ANTICOAGULANTS: Chronic | Status: ACTIVE | Noted: 2018-03-08

## 2019-01-20 PROBLEM — I48.91 UNSPECIFIED ATRIAL FIBRILLATION: Chronic | Status: ACTIVE | Noted: 2018-03-08

## 2019-01-20 PROBLEM — G89.29 OTHER CHRONIC PAIN: Chronic | Status: ACTIVE | Noted: 2018-03-08

## 2019-01-20 LAB
ALBUMIN SERPL ELPH-MCNC: 3.3 G/DL — SIGNIFICANT CHANGE UP (ref 3.3–5.2)
ALP SERPL-CCNC: 55 U/L — SIGNIFICANT CHANGE UP (ref 40–120)
ALT FLD-CCNC: 14 U/L — SIGNIFICANT CHANGE UP
ANION GAP SERPL CALC-SCNC: 12 MMOL/L — SIGNIFICANT CHANGE UP (ref 5–17)
AST SERPL-CCNC: 26 U/L — SIGNIFICANT CHANGE UP
BASOPHILS # BLD AUTO: 0 K/UL — SIGNIFICANT CHANGE UP (ref 0–0.2)
BASOPHILS NFR BLD AUTO: 0.2 % — SIGNIFICANT CHANGE UP (ref 0–2)
BILIRUB SERPL-MCNC: 0.4 MG/DL — SIGNIFICANT CHANGE UP (ref 0.4–2)
BUN SERPL-MCNC: 28 MG/DL — HIGH (ref 8–20)
CALCIUM SERPL-MCNC: 8.8 MG/DL — SIGNIFICANT CHANGE UP (ref 8.6–10.2)
CHLORIDE SERPL-SCNC: 108 MMOL/L — HIGH (ref 98–107)
CO2 SERPL-SCNC: 25 MMOL/L — SIGNIFICANT CHANGE UP (ref 22–29)
CREAT SERPL-MCNC: 0.65 MG/DL — SIGNIFICANT CHANGE UP (ref 0.5–1.3)
EOSINOPHIL # BLD AUTO: 0.1 K/UL — SIGNIFICANT CHANGE UP (ref 0–0.5)
EOSINOPHIL NFR BLD AUTO: 2.1 % — SIGNIFICANT CHANGE UP (ref 0–6)
GLUCOSE SERPL-MCNC: 133 MG/DL — HIGH (ref 70–115)
HCT VFR BLD CALC: 35.1 % — LOW (ref 37–47)
HGB BLD-MCNC: 11.2 G/DL — LOW (ref 12–16)
LYMPHOCYTES # BLD AUTO: 1.1 K/UL — SIGNIFICANT CHANGE UP (ref 1–4.8)
LYMPHOCYTES # BLD AUTO: 18.7 % — LOW (ref 20–55)
MCHC RBC-ENTMCNC: 28.4 PG — SIGNIFICANT CHANGE UP (ref 27–31)
MCHC RBC-ENTMCNC: 31.9 G/DL — LOW (ref 32–36)
MCV RBC AUTO: 88.9 FL — SIGNIFICANT CHANGE UP (ref 81–99)
MONOCYTES # BLD AUTO: 0.5 K/UL — SIGNIFICANT CHANGE UP (ref 0–0.8)
MONOCYTES NFR BLD AUTO: 8.6 % — SIGNIFICANT CHANGE UP (ref 3–10)
NEUTROPHILS # BLD AUTO: 4 K/UL — SIGNIFICANT CHANGE UP (ref 1.8–8)
NEUTROPHILS NFR BLD AUTO: 70.2 % — SIGNIFICANT CHANGE UP (ref 37–73)
PLATELET # BLD AUTO: 201 K/UL — SIGNIFICANT CHANGE UP (ref 150–400)
POTASSIUM SERPL-MCNC: 4.3 MMOL/L — SIGNIFICANT CHANGE UP (ref 3.5–5.3)
POTASSIUM SERPL-SCNC: 4.3 MMOL/L — SIGNIFICANT CHANGE UP (ref 3.5–5.3)
PROT SERPL-MCNC: 5.6 G/DL — LOW (ref 6.6–8.7)
RBC # BLD: 3.95 M/UL — LOW (ref 4.4–5.2)
RBC # FLD: 14.6 % — SIGNIFICANT CHANGE UP (ref 11–15.6)
SODIUM SERPL-SCNC: 145 MMOL/L — SIGNIFICANT CHANGE UP (ref 135–145)
WBC # BLD: 5.7 K/UL — SIGNIFICANT CHANGE UP (ref 4.8–10.8)
WBC # FLD AUTO: 5.7 K/UL — SIGNIFICANT CHANGE UP (ref 4.8–10.8)

## 2019-01-20 PROCEDURE — G0168: CPT

## 2019-01-20 PROCEDURE — 99285 EMERGENCY DEPT VISIT HI MDM: CPT | Mod: 25

## 2019-01-20 PROCEDURE — 72170 X-RAY EXAM OF PELVIS: CPT | Mod: 26

## 2019-01-20 PROCEDURE — 93010 ELECTROCARDIOGRAM REPORT: CPT

## 2019-01-20 PROCEDURE — 99223 1ST HOSP IP/OBS HIGH 75: CPT

## 2019-01-20 PROCEDURE — 73590 X-RAY EXAM OF LOWER LEG: CPT | Mod: 26,RT

## 2019-01-20 RX ORDER — ACETAMINOPHEN 500 MG
650 TABLET ORAL EVERY 6 HOURS
Qty: 0 | Refills: 0 | Status: DISCONTINUED | OUTPATIENT
Start: 2019-01-20 | End: 2019-01-25

## 2019-01-20 RX ORDER — CYCLOBENZAPRINE HYDROCHLORIDE 10 MG/1
10 TABLET, FILM COATED ORAL AT BEDTIME
Qty: 0 | Refills: 0 | Status: DISCONTINUED | OUTPATIENT
Start: 2019-01-20 | End: 2019-01-25

## 2019-01-20 RX ORDER — ASPIRIN/CALCIUM CARB/MAGNESIUM 324 MG
81 TABLET ORAL DAILY
Qty: 0 | Refills: 0 | Status: DISCONTINUED | OUTPATIENT
Start: 2019-01-20 | End: 2019-01-25

## 2019-01-20 RX ORDER — LISINOPRIL 2.5 MG/1
10 TABLET ORAL DAILY
Qty: 0 | Refills: 0 | Status: DISCONTINUED | OUTPATIENT
Start: 2019-01-20 | End: 2019-01-23

## 2019-01-20 RX ORDER — MORPHINE SULFATE 50 MG/1
15 CAPSULE, EXTENDED RELEASE ORAL EVERY 12 HOURS
Qty: 0 | Refills: 0 | Status: DISCONTINUED | OUTPATIENT
Start: 2019-01-20 | End: 2019-01-25

## 2019-01-20 RX ORDER — DOCUSATE SODIUM 100 MG
100 CAPSULE ORAL THREE TIMES A DAY
Qty: 0 | Refills: 0 | Status: DISCONTINUED | OUTPATIENT
Start: 2019-01-20 | End: 2019-01-25

## 2019-01-20 RX ORDER — TETANUS TOXOID, REDUCED DIPHTHERIA TOXOID AND ACELLULAR PERTUSSIS VACCINE, ADSORBED 5; 2.5; 8; 8; 2.5 [IU]/.5ML; [IU]/.5ML; UG/.5ML; UG/.5ML; UG/.5ML
0.5 SUSPENSION INTRAMUSCULAR ONCE
Qty: 0 | Refills: 0 | Status: COMPLETED | OUTPATIENT
Start: 2019-01-20 | End: 2019-01-20

## 2019-01-20 RX ORDER — ATORVASTATIN CALCIUM 80 MG/1
80 TABLET, FILM COATED ORAL AT BEDTIME
Qty: 0 | Refills: 0 | Status: DISCONTINUED | OUTPATIENT
Start: 2019-01-20 | End: 2019-01-25

## 2019-01-20 RX ORDER — ACETAMINOPHEN 500 MG
975 TABLET ORAL ONCE
Qty: 0 | Refills: 0 | Status: COMPLETED | OUTPATIENT
Start: 2019-01-20 | End: 2019-01-20

## 2019-01-20 RX ORDER — METOPROLOL TARTRATE 50 MG
12.5 TABLET ORAL
Qty: 0 | Refills: 0 | Status: DISCONTINUED | OUTPATIENT
Start: 2019-01-20 | End: 2019-01-25

## 2019-01-20 RX ORDER — ALBUTEROL 90 UG/1
1 AEROSOL, METERED ORAL EVERY 4 HOURS
Qty: 0 | Refills: 0 | Status: DISCONTINUED | OUTPATIENT
Start: 2019-01-20 | End: 2019-01-25

## 2019-01-20 RX ORDER — SENNA PLUS 8.6 MG/1
2 TABLET ORAL AT BEDTIME
Qty: 0 | Refills: 0 | Status: DISCONTINUED | OUTPATIENT
Start: 2019-01-20 | End: 2019-01-25

## 2019-01-20 RX ORDER — OXYBUTYNIN CHLORIDE 5 MG
5 TABLET ORAL THREE TIMES A DAY
Qty: 0 | Refills: 0 | Status: DISCONTINUED | OUTPATIENT
Start: 2019-01-20 | End: 2019-01-25

## 2019-01-20 RX ADMIN — Medication 975 MILLIGRAM(S): at 04:03

## 2019-01-20 RX ADMIN — Medication 975 MILLIGRAM(S): at 05:15

## 2019-01-20 RX ADMIN — TETANUS TOXOID, REDUCED DIPHTHERIA TOXOID AND ACELLULAR PERTUSSIS VACCINE, ADSORBED 0.5 MILLILITER(S): 5; 2.5; 8; 8; 2.5 SUSPENSION INTRAMUSCULAR at 04:02

## 2019-01-20 NOTE — ED ADULT NURSE NOTE - CHIEF COMPLAINT QUOTE
Pt a&ox3, speaking coherently, and following commands presents s/p trip and fall at home, pt states she was unable to get up so used her life-alert. Pt denies any pain, hitting head, or any injuries. Pt is noted ot have swelling/edema to b/l LE and sustained a skin tear to Right LE from EMS when they were moving her to stretcher. Pt on Xarelto.

## 2019-01-20 NOTE — H&P ADULT - FAMILY HISTORY
Mother  Still living? No  Family history of cerebrovascular accident (CVA), Age at diagnosis: Age Unknown     Father  Still living? No  Family history of essential hypertension, Age at diagnosis: Age Unknown

## 2019-01-20 NOTE — H&P ADULT - RS GEN PE MLT RESP DETAILS PC
no wheezes/respirations non-labored respirations non-labored/no wheezes/clear to auscultation bilaterally

## 2019-01-20 NOTE — ED ADULT NURSE NOTE - OBJECTIVE STATEMENT
82 y/o F pt BIBA with PMHx of Afib, CAD, chronic anticoagulation, and chronic pain presents to the ED c/o ELENA LE edema s/p fall at home 2 hours PTA. Pt states she was getting into bed when she slipped, landed on her buttocks on the step stool. She reports pressing Life-Alert  and being unable to stand. Patient recalls entire event. She is on Xarelto. Denies hitting head, abd pain, CP, SOB, neck pain, LOC, knee pain. No further complaints at this time.

## 2019-01-20 NOTE — ED PROCEDURE NOTE - PROCEDURE ADDITIONAL DETAILS
2 cm long skin avulsion present on right lower leg. Area of skin removed, unable to be approximated. Wound irrigated copiously with normal saline. Dermabond tissue adhesive applied over wound edges. Wound dressed with sterile gauze. Patient given thorough instructions on wound care. Patient verbalized understanding of care for wound

## 2019-01-20 NOTE — PHYSICAL THERAPY INITIAL EVALUATION ADULT - ACTIVE RANGE OF MOTION EXAMINATION, REHAB EVAL
no Active ROM deficits were identified/with exception to b/l shoulder flexion 0-30 degrees - pt. reports limitation prior to admission

## 2019-01-20 NOTE — ED PROVIDER NOTE - OBJECTIVE STATEMENT
82 y/o F pt BIBA with PMHx of Afib, CAD, chronic anticoagulation, and chronic pain presents to the ED c/o ELENA LE edema s/p fall at home 2 hours PTA. Pt states she was getting into bed when she slipped, landed on her buttocks on the step stool. She reports pressing Life-Alert  and being unable to stand. Patient recalls entire event. She is on Xarelto. Denies hitting head, abd pain, CP, SOB, neck pain, LOC, knee pain. No further complaints at this time.    PCP: Dr. Schilling

## 2019-01-20 NOTE — ED ADULT TRIAGE NOTE - CHIEF COMPLAINT QUOTE
Pt a&ox3, speaking coherently, and following commands presents s/p trip and fall at home, pt states she was unable to get up so used her life-alert. Pt denies any pain, hitting head, or any injuries. Pt is noted ot have swelling/edema to b/l LE and sustained a skin tear from EMS when they were moving her to stretcher. Pt on Xaralto. Pt a&ox3, speaking coherently, and following commands presents s/p trip and fall at home, pt states she was unable to get up so used her life-alert. Pt denies any pain, hitting head, or any injuries. Pt is noted ot have swelling/edema to b/l LE and sustained a skin tear to Right LE from EMS when they were moving her to stretcher. Pt on Xarelto.

## 2019-01-20 NOTE — ED PROVIDER NOTE - CONSTITUTIONAL, MLM
normal... NC/AT, obese, awake, alert, oriented to person, place, time/situation and in no apparent distress. No active bleeding

## 2019-01-20 NOTE — ED ADULT NURSE REASSESSMENT NOTE - NS ED NURSE REASSESS COMMENT FT1
Pt resting comfortably on stretcher, easy to arouse. Pt denies any questions or complaints at this time, updated on plan of care to be admitted pending lab results for LESLIE placement. Pt in no apparent distress, will continue to monitor and reassess.

## 2019-01-20 NOTE — PROVIDER CONTACT NOTE (OTHER) - BACKGROUND
Pt. incontinent to urine during eval. Gown and bedding changed. Pt. reports difficulty returning to bed at home due to height. Pt. uses step stool with minimal assist x1 and RW to ascend 6" step stool

## 2019-01-20 NOTE — ED ADULT NURSE REASSESSMENT NOTE - NS ED NURSE REASSESS COMMENT FT1
Pt resting comfortably on stretcher, easy to arouse. Pt denies any pain or discomfort. Pt updated on plan of care, pt evaluated by SW and PT, plan for LESLIE placement. Pt denies any questions or complaints at this time. Will continue to monitor and reassess.

## 2019-01-20 NOTE — ED ADULT NURSE NOTE - PMH
Atrial fibrillation, unspecified type    CAD (coronary artery disease)    Chronic anticoagulation    Chronic pain Atrial fibrillation, unspecified type    CAD (coronary artery disease)    Chronic anticoagulation    Chronic pain    Diabetes    GERD (gastroesophageal reflux disease)    High cholesterol

## 2019-01-20 NOTE — ED ADULT NURSE NOTE - NSIMPLEMENTINTERV_GEN_ALL_ED
Implemented All Universal Safety Interventions:  [place] to call system. Call bell, personal items and telephone within reach. Instruct patient to call for assistance. Room bathroom lighting operational. Non-slip footwear when patient is off stretcher. Physically safe environment: no spills, clutter or unnecessary equipment. Stretcher in lowest position, wheels locked, appropriate side rails in place.

## 2019-01-20 NOTE — PROVIDER CONTACT NOTE (OTHER) - ACTION/TREATMENT ORDERED:
short term goals (2-3 visits) as follows: bed mobility: supervision, transfer and ambulation: CG with RW 50 ft, 1 (6") step with CG assist x 1 and RW

## 2019-01-20 NOTE — ED ADULT NURSE REASSESSMENT NOTE - NS ED NURSE REASSESS COMMENT FT1
pt status unchanged, refer to flowsheet and chart, pt safety maintained, pt hemodynamically stable, pending PT and SW

## 2019-01-20 NOTE — ED PROVIDER NOTE - MUSCULOSKELETAL, MLM
Spine appears normal, range of motion is not limited, no muscle or joint tenderness. No palpable tenderness. No localized tenderness. Pelvis stable to rock and lateral compression

## 2019-01-20 NOTE — H&P ADULT - HISTORY OF PRESENT ILLNESS
82 y/o female with PMH of Afib on Xarelto, HTN, DM-2, CAD, and chronic pain came to the ED s/p fall. Patient said she wanted to lay on her bed while stepping on the step stool she slipped and fell; landed on her buttocks on the step stool with her right leg stuck between the stool and the bed. She pressed the life-alert   She reports pressing Life-Alert  and being unable to stand. Patient recalls entire event. She is on Xarelto. Denies hitting head, abd pain, CP, SOB, neck pain, LOC, knee pain. No further complaints at this time. 84 y/o female with PMH of Afib on Xarelto, HTN, DM-2, CAD, and chronic pain came to the ED s/p fall. Patient said she wanted to lay on her bed while stepping on the step stool she slipped and fell; landed on her buttocks on the step stool with her right leg stuck between the stool and the bed. She pressed the life-alert and EMS came to get her to the ED. Patient was able to recall all the event; she did not hit her head. She has no chest pain, shortness of breath, blurry vision, dizziness, abdominal pain, change in bowel/urinary habit, nausea/vomit, weakness, numbness, tingling.

## 2019-01-20 NOTE — ED PROVIDER NOTE - ENMT, MLM
Airway patent, moist mucous membranes, no JVD. no urbano/rhinorrhea. No fluid or blood coming out ears

## 2019-01-20 NOTE — ED ADULT NURSE REASSESSMENT NOTE - NS ED NURSE REASSESS COMMENT FT1
Report received from off going RN, charting as noted. Patient A&Ox4, denies any pain or discomfort. Denies any chest pain, shortness of breath, nausea or dizziness. Respirations even & unlabored, denies any numbness or tingling. Saline lock in place, patent, negative s/s phlebitis or infiltration. Family at bedside. Plan of care discussed, all questions answered. Will monitor.

## 2019-01-20 NOTE — PROVIDER CONTACT NOTE (OTHER) - SITUATION
Chart reviewed, contents noted. MD order for PT consult received. Eval completed; see documents for details.

## 2019-01-20 NOTE — PHYSICAL THERAPY INITIAL EVALUATION ADULT - ADDITIONAL COMMENTS
Pt. lives in a house with a ramp to enter and no stairs inside. Pt.'s living area is connected to her brother and sister in law, but both of them amb with [facility] and are limited with amount they are able to assist physically. Pt. was modified independent with a RW prior to admission and also owns a commode and shower chair.

## 2019-01-20 NOTE — ED PROVIDER NOTE - PROGRESS NOTE DETAILS
X-rays neg.  Avulsion repaired with Dermabond.  Attempted to ambulate pt which was difficult and will hold for PT eval and eval by SW this AM jessica: pt signed out by dr. donaldson--pending pt and social work--social work notes that pt needs subacute rehab--will admit

## 2019-01-20 NOTE — PROVIDER CONTACT NOTE (OTHER) - ASSESSMENT
to safely return to stretcher in ED. Pt. left on stretcher, in NAD, (+) CB. Patient educted on use of CB for all needs/transfers. PT will follow. Pre & post session pain: 0/10.

## 2019-01-20 NOTE — CHART NOTE - NSCHARTNOTEFT_GEN_A_CORE
SW Note: Sw spoke PT and they're recommending [facility]. SW spoke to pt at bedside and pt in agreement with plan. Pt states she would like [facility] for rehab as first choice since she has been there three times in the past two years. Pt would want [facility] as second choice and [facility] as third. Pt has medicare and will need to stay for 3 days for [facility]. SW to follow for d/c placement.

## 2019-01-20 NOTE — H&P ADULT - ASSESSMENT
Fall  Admit to medical floor   XR pelvis: no fracture   PT recommended [facility]  SW on board for placement   Tylenol 650mg PRN for pain     Skin laceration   S/p repair in ED   Tetanus shot given     Afib   Continue home medications     CAD   Continue home medications     Supportive   DVT prophylaxis: Xarelto 15mg   Diet: heart healthy 82 y/o female with PMH of Afib on Xarelto, HTN, DM-2, CAD, and chronic pain came to the ED s/p fall.     Fall  Admit to medical floor   XR pelvis: no fracture   PT recommended [facility]  SW on board for placement   Tylenol 650mg PRN for pain   Fall precaution     RLE Skin laceration   S/p repair in ED   Tetanus shot given     Afib   Continue Xarelto for now.   Risk of bleeding explained to patient again while on this medication.     CAD   Continue Rosuvastatin 20mg     HTN  Continue home medication     DM-2  Insulin sliding scale     Supportive   DVT prophylaxis: Xarelto 15mg   Diet: heart healthy 84 y/o female with PMH of Afib on Xarelto, HTN, DM-2, CAD, and chronic pain came to the ED s/p fall.     Fall  Admit to medical floor   XR pelvis: no fracture   PT recommended [facility]  SW on board for placement   Tylenol 650mg PRN for pain   Fall precaution     RLE Skin laceration   S/p repair in ED   Tetanus shot given     Afib   Continue Xarelto for now.   Risk of bleeding explained to patient again while on this medication.     CAD   Continue Rosuvastatin 20mg   Aspirin 81mg     HTN  Continue home medication     DM-2  Insulin sliding scale     Chronic pain   Morphine 15mg PO PRN   Flexeril 10mg PRN     Supportive   DVT prophylaxis: Xarelto 15mg   Diet: heart healthy

## 2019-01-21 LAB
INR BLD: 1.19 RATIO — HIGH (ref 0.88–1.16)
PROTHROM AB SERPL-ACNC: 13.7 SEC — HIGH (ref 10–12.9)

## 2019-01-21 PROCEDURE — 99232 SBSQ HOSP IP/OBS MODERATE 35: CPT

## 2019-01-21 RX ORDER — CEFAZOLIN SODIUM 1 G
500 VIAL (EA) INJECTION EVERY 8 HOURS
Qty: 0 | Refills: 0 | Status: DISCONTINUED | OUTPATIENT
Start: 2019-01-21 | End: 2019-01-23

## 2019-01-21 RX ADMIN — Medication 5 MILLIGRAM(S): at 05:39

## 2019-01-21 RX ADMIN — Medication 5 MILLIGRAM(S): at 12:12

## 2019-01-21 RX ADMIN — Medication 100 MILLIGRAM(S): at 05:39

## 2019-01-21 RX ADMIN — Medication 100 MILLIGRAM(S): at 22:31

## 2019-01-21 RX ADMIN — LISINOPRIL 10 MILLIGRAM(S): 2.5 TABLET ORAL at 05:39

## 2019-01-21 RX ADMIN — SENNA PLUS 2 TABLET(S): 8.6 TABLET ORAL at 22:31

## 2019-01-21 RX ADMIN — Medication 100 MILLIGRAM(S): at 12:12

## 2019-01-21 RX ADMIN — Medication 12.5 MILLIGRAM(S): at 05:39

## 2019-01-21 RX ADMIN — Medication 100 MILLIGRAM(S): at 22:32

## 2019-01-21 RX ADMIN — Medication 5 MILLIGRAM(S): at 22:31

## 2019-01-21 RX ADMIN — Medication 81 MILLIGRAM(S): at 12:12

## 2019-01-21 NOTE — PROGRESS NOTE ADULT - ASSESSMENT
82 y/o female with PMH of Afib on Xarelto, HTN, DM-2, CAD, and chronic pain came to the ED s/p fall.     Fall-  XR pelvis: no fracture   PT recommended [facility]  SW on board for placement   Tylenol 650mg PRN for pain   Fall precaution     RLE Skin laceration with cellulitis- start Ancef follow culture---Right calf swelling- check doppler   S/p repair in ED   Tetanus shot given     Afib   Continue Xarelto for now.   Risk of bleeding explained to patient again while on this medication.     CAD   Continue Rosuvastatin 20mg   Aspirin 81mg     HTN  Continue home medication     DM-2  Insulin sliding scale     Chronic pain   Morphine 15mg PO PRN   Flexeril 10mg PRN     Supportive   DVT prophylaxis: Xarelto 15mg   Diet: heart healthy

## 2019-01-21 NOTE — PROGRESS NOTE ADULT - SUBJECTIVE AND OBJECTIVE BOX
FLEX MORALES Patient is a 83y old  Female who presents with a chief complaint of Fall (20 Jan 2019 19:24)     HPI:  84 y/o female with PMH of Afib on Xarelto, HTN, DM-2, CAD, and chronic pain came to the ED s/p fall. Patient said she wanted to lay on her bed while stepping on the step stool she slipped and fell; landed on her buttocks on the step stool with her right leg stuck between the stool and the bed. She pressed the life-alert and EMS came to get her to the ED. Patient was able to recall all the event; she did not hit her head. She has no chest pain, shortness of breath, blurry vision, dizziness, abdominal pain, change in bowel/urinary habit, nausea/vomit, weakness, numbness, tingling. (20 Jan 2019 19:24)    The patient was seen and evaluated   The patient is in no acute distress.  Denied any fever chest pain, palpitations, shortness of breath, abdominal pain, fever, dysuria, cough, edema   Complains of pain in the leg     I&O's Summary    Allergies    Cipro (Rash)  latex (Rash)    Intolerances      HEALTH ISSUES - PROBLEM Dx:        PAST MEDICAL & SURGICAL HISTORY:  GERD (gastroesophageal reflux disease)  Diabetes  High cholesterol  Chronic anticoagulation  Chronic pain  Atrial fibrillation, unspecified type  CAD (coronary artery disease)  History of knee replacement, total, bilateral  History of hip replacement          Vital Signs Last 24 Hrs  T(C): 36.8 (21 Jan 2019 15:35), Max: 36.8 (21 Jan 2019 15:35)  T(F): 98.3 (21 Jan 2019 15:35), Max: 98.3 (21 Jan 2019 15:35)  HR: 64 (21 Jan 2019 15:35) (64 - 84)  BP: 112/68 (21 Jan 2019 15:35) (112/68 - 125/67)  BP(mean): --  RR: 18 (21 Jan 2019 15:35) (17 - 20)  SpO2: 95% (21 Jan 2019 15:35) (95% - 96%)T(C): 36.8 (01-21-19 @ 15:35), Max: 36.8 (01-21-19 @ 15:35)  HR: 64 (01-21-19 @ 15:35) (64 - 84)  BP: 112/68 (01-21-19 @ 15:35) (112/68 - 125/67)  RR: 18 (01-21-19 @ 15:35) (17 - 20)  SpO2: 95% (01-21-19 @ 15:35) (95% - 96%)  Wt(kg): --    PHYSICAL EXAM:    GENERAL: NAD OBESE  HEAD:  Atraumatic, Normocephalic  EYES: EOMI, PERRL  NERVOUS SYSTEM:  Alert & Oriented X3,  Moves upper and lower extremities; CNS-II-XII  CHEST/LUNG: Clear to auscultation bilaterally; No rales, rhonchi, wheezing,   HEART: Regular rate and rhythm; No murmurs,   ABDOMEN: Soft, Nontender, Nondistended; Bowel sounds present  EXTREMITIES:  Peripheral Pulses, right leg with dressing, redness and swelling and tenderness  psychiatry- mood and affect appropriate Insight and judgement intact     acetaminophen   Tablet .. 650 milliGRAM(s) Oral every 6 hours PRN  ALBUTerol    90 MICROgram(s) HFA Inhaler 1 Puff(s) Inhalation every 4 hours PRN  aspirin enteric coated 81 milliGRAM(s) Oral daily  atorvastatin 80 milliGRAM(s) Oral at bedtime  ceFAZolin   IVPB 500 milliGRAM(s) IV Intermittent every 8 hours  cyclobenzaprine 10 milliGRAM(s) Oral at bedtime PRN  docusate sodium 100 milliGRAM(s) Oral three times a day  lisinopril 10 milliGRAM(s) Oral daily  metoprolol tartrate 12.5 milliGRAM(s) Oral two times a day  morphine ER Tablet 15 milliGRAM(s) Oral every 12 hours PRN  oxybutynin 5 milliGRAM(s) Oral three times a day  senna 2 Tablet(s) Oral at bedtime      LABS:                          11.2   5.7   )-----------( 201      ( 20 Jan 2019 17:11 )             35.1     01-20    145  |  108<H>  |  28.0<H>  ----------------------------<  133<H>  4.3   |  25.0  |  0.65    Ca    8.8      20 Jan 2019 17:11    TPro  5.6<L>  /  Alb  3.3  /  TBili  0.4  /  DBili  x   /  AST  26  /  ALT  14  /  AlkPhos  55  01-20    LIVER FUNCTIONS - ( 20 Jan 2019 17:11 )  Alb: 3.3 g/dL / Pro: 5.6 g/dL / ALK PHOS: 55 U/L / ALT: 14 U/L / AST: 26 U/L / GGT: x           PT/INR - ( 21 Jan 2019 07:05 )   PT: 13.7 sec;   INR: 1.19 ratio                 CAPILLARY BLOOD GLUCOSE          RADIOLOGY & ADDITIONAL TESTS:      Consultant notes reviewed    Case discussed with consultant/provider/ family /patient

## 2019-01-21 NOTE — ED ADULT NURSE REASSESSMENT NOTE - NS ED NURSE REASSESS COMMENT FT1
Patient asleep, negative obvious pain, discomfort or distress, respirations even/unlabored. Will continue to monitor.

## 2019-01-21 NOTE — ED ADULT NURSE REASSESSMENT NOTE - NS ED NURSE REASSESS COMMENT FT1
Patient A&Ox4, denies any pain or discomfort. Incontinence care performed by SNA, patient placed in position of comfort. Denies any chest pain, shortness of breath, nausea or dizziness. Saline lock in place, patent, negative s/s phlebitis or infiltration. Will continue to monitor.

## 2019-01-22 LAB
GLUCOSE BLDC GLUCOMTR-MCNC: 198 MG/DL — HIGH (ref 70–99)
GLUCOSE BLDC GLUCOMTR-MCNC: 206 MG/DL — HIGH (ref 70–99)

## 2019-01-22 PROCEDURE — 93971 EXTREMITY STUDY: CPT | Mod: 26,RT

## 2019-01-22 PROCEDURE — 99232 SBSQ HOSP IP/OBS MODERATE 35: CPT

## 2019-01-22 RX ORDER — DIPHENHYDRAMINE HCL 50 MG
50 CAPSULE ORAL ONCE
Qty: 0 | Refills: 0 | Status: COMPLETED | OUTPATIENT
Start: 2019-01-22 | End: 2019-01-23

## 2019-01-22 RX ORDER — NYSTATIN CREAM 100000 [USP'U]/G
1 CREAM TOPICAL THREE TIMES A DAY
Qty: 0 | Refills: 0 | Status: DISCONTINUED | OUTPATIENT
Start: 2019-01-22 | End: 2019-01-25

## 2019-01-22 RX ORDER — DEXTROSE 50 % IN WATER 50 %
25 SYRINGE (ML) INTRAVENOUS ONCE
Qty: 0 | Refills: 0 | Status: DISCONTINUED | OUTPATIENT
Start: 2019-01-22 | End: 2019-01-25

## 2019-01-22 RX ORDER — SODIUM CHLORIDE 9 MG/ML
1000 INJECTION, SOLUTION INTRAVENOUS
Qty: 0 | Refills: 0 | Status: DISCONTINUED | OUTPATIENT
Start: 2019-01-22 | End: 2019-01-25

## 2019-01-22 RX ORDER — DEXTROSE 50 % IN WATER 50 %
12.5 SYRINGE (ML) INTRAVENOUS ONCE
Qty: 0 | Refills: 0 | Status: DISCONTINUED | OUTPATIENT
Start: 2019-01-22 | End: 2019-01-25

## 2019-01-22 RX ORDER — INSULIN LISPRO 100/ML
VIAL (ML) SUBCUTANEOUS
Qty: 0 | Refills: 0 | Status: DISCONTINUED | OUTPATIENT
Start: 2019-01-22 | End: 2019-01-25

## 2019-01-22 RX ORDER — GLUCAGON INJECTION, SOLUTION 0.5 MG/.1ML
1 INJECTION, SOLUTION SUBCUTANEOUS ONCE
Qty: 0 | Refills: 0 | Status: DISCONTINUED | OUTPATIENT
Start: 2019-01-22 | End: 2019-01-25

## 2019-01-22 RX ORDER — DEXTROSE 50 % IN WATER 50 %
15 SYRINGE (ML) INTRAVENOUS ONCE
Qty: 0 | Refills: 0 | Status: DISCONTINUED | OUTPATIENT
Start: 2019-01-22 | End: 2019-01-25

## 2019-01-22 RX ORDER — RIVAROXABAN 15 MG-20MG
20 KIT ORAL EVERY 24 HOURS
Qty: 0 | Refills: 0 | Status: DISCONTINUED | OUTPATIENT
Start: 2019-01-22 | End: 2019-01-23

## 2019-01-22 RX ORDER — SACCHAROMYCES BOULARDII 250 MG
250 POWDER IN PACKET (EA) ORAL
Qty: 0 | Refills: 0 | Status: DISCONTINUED | OUTPATIENT
Start: 2019-01-22 | End: 2019-01-25

## 2019-01-22 RX ADMIN — Medication 81 MILLIGRAM(S): at 13:38

## 2019-01-22 RX ADMIN — Medication 100 MILLIGRAM(S): at 13:39

## 2019-01-22 RX ADMIN — Medication 1: at 22:46

## 2019-01-22 RX ADMIN — Medication 100 MILLIGRAM(S): at 13:38

## 2019-01-22 RX ADMIN — Medication 100 MILLIGRAM(S): at 22:03

## 2019-01-22 RX ADMIN — Medication 12.5 MILLIGRAM(S): at 17:08

## 2019-01-22 RX ADMIN — Medication 5 MILLIGRAM(S): at 22:03

## 2019-01-22 RX ADMIN — Medication 250 MILLIGRAM(S): at 17:09

## 2019-01-22 RX ADMIN — Medication 12.5 MILLIGRAM(S): at 05:12

## 2019-01-22 RX ADMIN — Medication 100 MILLIGRAM(S): at 05:12

## 2019-01-22 RX ADMIN — NYSTATIN CREAM 1 APPLICATION(S): 100000 CREAM TOPICAL at 22:03

## 2019-01-22 RX ADMIN — RIVAROXABAN 20 MILLIGRAM(S): KIT at 17:09

## 2019-01-22 RX ADMIN — LISINOPRIL 10 MILLIGRAM(S): 2.5 TABLET ORAL at 05:12

## 2019-01-22 RX ADMIN — Medication 2: at 17:09

## 2019-01-22 RX ADMIN — SENNA PLUS 2 TABLET(S): 8.6 TABLET ORAL at 22:03

## 2019-01-22 RX ADMIN — Medication 100 MILLIGRAM(S): at 05:01

## 2019-01-22 RX ADMIN — Medication 5 MILLIGRAM(S): at 05:12

## 2019-01-22 RX ADMIN — ATORVASTATIN CALCIUM 80 MILLIGRAM(S): 80 TABLET, FILM COATED ORAL at 22:03

## 2019-01-22 RX ADMIN — Medication 5 MILLIGRAM(S): at 13:38

## 2019-01-22 NOTE — PROGRESS NOTE ADULT - ASSESSMENT
84 y/o female with PMH of Afib on Xarelto, HTN, DM-2, CAD, and chronic pain came to the ED s/p fall.     1) Ambulatory Dysfunction secondary to Fall  XR pelvis: no fracture   Pain control  PT recommended [facility]  2) RLE Skin laceration with cellulitis  Continue Ancef   Add Florastor  Doppler negative for DVT  3) Chronic A. Fib   Rate controlled  Continue Metoprolol  Start Xarelto    4) CAD/ HLD/ HTN  Continue Aspirin, Lipitor, Metoprolol and Lisinopril  5) DM-2  Carb Consistent Diet  Start Accu checks and ISS  6) Chronic pain   Continue Morphine 15mg and Flexeril 10mg    DVT Prophylaxis -- Patient is on Xarelto     Dispo: [facility]

## 2019-01-22 NOTE — PROGRESS NOTE ADULT - SUBJECTIVE AND OBJECTIVE BOX
Fall    HPI:  82 y/o female with PMH of Afib on Xarelto, HTN, DM-2, CAD, and chronic pain came to the ED s/p fall. Patient said she wanted to lay on her bed while stepping on the step stool she slipped and fell; landed on her buttocks on the step stool with her right leg stuck between the stool and the bed. She pressed the life-alert and EMS came to get her to the ED. Patient was able to recall all the event; she did not hit her head. She has no chest pain, shortness of breath, blurry vision, dizziness, abdominal pain, change in bowel/urinary habit, nausea/vomit, weakness, numbness, tingling. (20 Jan 2019 19:24)    Interval History:  Patient was seen and examined at bedside around 8:30 am. Complaining of pain in right leg at the site of laceration.   Denies chest pain, palpitations, shortness of breath, headache, dizziness, visual symptoms, nausea, vomiting or abdominal pain.  No overnight issues.    ROS:  As per interval history otherwise unremarkable.    PHYSICAL EXAM:  Vital Signs   T(C): 36.9 (22 Jan 2019 11:18), Max: 36.9 (22 Jan 2019 11:18)  T(F): 98.4 (22 Jan 2019 11:18), Max: 98.4 (22 Jan 2019 11:18)  HR: 54 (22 Jan 2019 11:18) (54 - 77)  BP: 113/61 (22 Jan 2019 11:18) (101/53 - 114/63)  RR: 18 (22 Jan 2019 11:18) (18 - 18)  SpO2: 94% (21 Jan 2019 21:31) (94% - 97%)  General: Elderly female lying in bed comfortably. No acute distress  HEENT: EOMI. Clear conjunctivae. Dry mucus membrane  Neck: Supple. No JVD.   Chest: CTA bilaterally - no wheezing, rales or rhonchi.   Heart: Normal S1 & S2. RRR.   Abdomen: Obese. Soft. Non-tender. + BS  Ext: No pedal edema. No calf tenderness. Dressing on right leg at the site of laceration.    Neuro: AAO x 3. No focal deficit. No speech disorder  Skin: Warm and Dry  Psychiatry: Normal mood and affect    I&O's Summary    21 Jan 2019 07:01  -  22 Jan 2019 07:00  --------------------------------------------------------  IN: 100 mL / OUT: 0 mL / NET: 100 mL    MEDICATIONS  (STANDING):  aspirin enteric coated 81 milliGRAM(s) Oral daily  atorvastatin 80 milliGRAM(s) Oral at bedtime  ceFAZolin   IVPB 500 milliGRAM(s) IV Intermittent every 8 hours  docusate sodium 100 milliGRAM(s) Oral three times a day  lisinopril 10 milliGRAM(s) Oral daily  metoprolol tartrate 12.5 milliGRAM(s) Oral two times a day  oxybutynin 5 milliGRAM(s) Oral three times a day  senna 2 Tablet(s) Oral at bedtime    MEDICATIONS  (PRN):  acetaminophen   Tablet .. 650 milliGRAM(s) Oral every 6 hours PRN Temp greater or equal to 38C (100.4F), Mild Pain (1 - 3), Moderate Pain (4 - 6)  ALBUTerol    90 MICROgram(s) HFA Inhaler 1 Puff(s) Inhalation every 4 hours PRN Shortness of Breath and/or Wheezing  cyclobenzaprine 10 milliGRAM(s) Oral at bedtime PRN Muscle Spasm  morphine ER Tablet 15 milliGRAM(s) Oral every 12 hours PRN Moderate to sever pain      LABS:                        11.2   5.7   )-----------( 201      ( 20 Jan 2019 17:11 )             35.1     01-20    145  |  108<H>  |  28.0<H>  ----------------------------<  133<H>  4.3   |  25.0  |  0.65    Ca    8.8      20 Jan 2019 17:11    TPro  5.6<L>  /  Alb  3.3  /  TBili  0.4  /  DBili  x   /  AST  26  /  ALT  14  /  AlkPhos  55  01-20    PT/INR - ( 21 Jan 2019 07:05 )   PT: 13.7 sec;   INR: 1.19 ratio      RADIOLOGY & ADDITIONAL STUDIES:  Reviewed

## 2019-01-23 ENCOUNTER — TRANSCRIPTION ENCOUNTER (OUTPATIENT)
Age: 84
End: 2019-01-23

## 2019-01-23 LAB
ANION GAP SERPL CALC-SCNC: 12 MMOL/L — SIGNIFICANT CHANGE UP (ref 5–17)
ANION GAP SERPL CALC-SCNC: 12 MMOL/L — SIGNIFICANT CHANGE UP (ref 5–17)
BUN SERPL-MCNC: 51 MG/DL — HIGH (ref 8–20)
BUN SERPL-MCNC: 51 MG/DL — HIGH (ref 8–20)
CALCIUM SERPL-MCNC: 8 MG/DL — LOW (ref 8.6–10.2)
CALCIUM SERPL-MCNC: 8.1 MG/DL — LOW (ref 8.6–10.2)
CHLORIDE SERPL-SCNC: 106 MMOL/L — SIGNIFICANT CHANGE UP (ref 98–107)
CHLORIDE SERPL-SCNC: 107 MMOL/L — SIGNIFICANT CHANGE UP (ref 98–107)
CK SERPL-CCNC: 82 U/L — SIGNIFICANT CHANGE UP (ref 25–170)
CO2 SERPL-SCNC: 20 MMOL/L — LOW (ref 22–29)
CO2 SERPL-SCNC: 23 MMOL/L — SIGNIFICANT CHANGE UP (ref 22–29)
CREAT SERPL-MCNC: 1.6 MG/DL — HIGH (ref 0.5–1.3)
CREAT SERPL-MCNC: 1.67 MG/DL — HIGH (ref 0.5–1.3)
EOSINOPHIL # BLD AUTO: 0.4 K/UL — SIGNIFICANT CHANGE UP (ref 0–0.5)
EOSINOPHIL NFR BLD AUTO: 3.8 % — SIGNIFICANT CHANGE UP (ref 0–6)
GLUCOSE BLDC GLUCOMTR-MCNC: 135 MG/DL — HIGH (ref 70–99)
GLUCOSE BLDC GLUCOMTR-MCNC: 169 MG/DL — HIGH (ref 70–99)
GLUCOSE BLDC GLUCOMTR-MCNC: 174 MG/DL — HIGH (ref 70–99)
GLUCOSE BLDC GLUCOMTR-MCNC: 175 MG/DL — HIGH (ref 70–99)
GLUCOSE SERPL-MCNC: 177 MG/DL — HIGH (ref 70–115)
GLUCOSE SERPL-MCNC: 182 MG/DL — HIGH (ref 70–115)
HCT VFR BLD CALC: 38.3 % — SIGNIFICANT CHANGE UP (ref 37–47)
HGB BLD-MCNC: 12.3 G/DL — SIGNIFICANT CHANGE UP (ref 12–16)
LYMPHOCYTES # BLD AUTO: 1.2 K/UL — SIGNIFICANT CHANGE UP (ref 1–4.8)
LYMPHOCYTES # BLD AUTO: 9.8 % — LOW (ref 20–55)
MAGNESIUM SERPL-MCNC: 1.6 MG/DL — SIGNIFICANT CHANGE UP (ref 1.6–2.6)
MCHC RBC-ENTMCNC: 28.4 PG — SIGNIFICANT CHANGE UP (ref 27–31)
MCHC RBC-ENTMCNC: 32.1 G/DL — SIGNIFICANT CHANGE UP (ref 32–36)
MCV RBC AUTO: 88.5 FL — SIGNIFICANT CHANGE UP (ref 81–99)
MONOCYTES # BLD AUTO: 0.4 K/UL — SIGNIFICANT CHANGE UP (ref 0–0.8)
MONOCYTES NFR BLD AUTO: 3.7 % — SIGNIFICANT CHANGE UP (ref 3–10)
NEUTROPHILS # BLD AUTO: 9.7 K/UL — HIGH (ref 1.8–8)
NEUTROPHILS NFR BLD AUTO: 82.4 % — HIGH (ref 37–73)
PLATELET # BLD AUTO: 245 K/UL — SIGNIFICANT CHANGE UP (ref 150–400)
POTASSIUM SERPL-MCNC: 4.6 MMOL/L — SIGNIFICANT CHANGE UP (ref 3.5–5.3)
POTASSIUM SERPL-MCNC: 4.8 MMOL/L — SIGNIFICANT CHANGE UP (ref 3.5–5.3)
POTASSIUM SERPL-SCNC: 4.6 MMOL/L — SIGNIFICANT CHANGE UP (ref 3.5–5.3)
POTASSIUM SERPL-SCNC: 4.8 MMOL/L — SIGNIFICANT CHANGE UP (ref 3.5–5.3)
RBC # BLD: 4.33 M/UL — LOW (ref 4.4–5.2)
RBC # FLD: 15.2 % — SIGNIFICANT CHANGE UP (ref 11–15.6)
SODIUM SERPL-SCNC: 138 MMOL/L — SIGNIFICANT CHANGE UP (ref 135–145)
SODIUM SERPL-SCNC: 142 MMOL/L — SIGNIFICANT CHANGE UP (ref 135–145)
WBC # BLD: 11.8 K/UL — HIGH (ref 4.8–10.8)
WBC # FLD AUTO: 11.8 K/UL — HIGH (ref 4.8–10.8)

## 2019-01-23 PROCEDURE — 99232 SBSQ HOSP IP/OBS MODERATE 35: CPT

## 2019-01-23 RX ORDER — SODIUM CHLORIDE 9 MG/ML
1000 INJECTION INTRAMUSCULAR; INTRAVENOUS; SUBCUTANEOUS ONCE
Qty: 0 | Refills: 0 | Status: COMPLETED | OUTPATIENT
Start: 2019-01-23 | End: 2019-01-23

## 2019-01-23 RX ORDER — ACETAMINOPHEN 500 MG
2 TABLET ORAL
Qty: 0 | Refills: 0 | COMMUNITY
Start: 2019-01-23

## 2019-01-23 RX ORDER — SODIUM CHLORIDE 9 MG/ML
1000 INJECTION INTRAMUSCULAR; INTRAVENOUS; SUBCUTANEOUS
Qty: 0 | Refills: 0 | Status: COMPLETED | OUTPATIENT
Start: 2019-01-23 | End: 2019-01-25

## 2019-01-23 RX ORDER — SACCHAROMYCES BOULARDII 250 MG
1 POWDER IN PACKET (EA) ORAL
Qty: 0 | Refills: 0 | COMMUNITY
Start: 2019-01-23 | End: 2019-01-27

## 2019-01-23 RX ORDER — AZTREONAM 2 G
1 VIAL (EA) INJECTION
Qty: 14 | Refills: 0 | OUTPATIENT
Start: 2019-01-23 | End: 2019-01-29

## 2019-01-23 RX ORDER — SACCHAROMYCES BOULARDII 250 MG
1 POWDER IN PACKET (EA) ORAL
Qty: 0 | Refills: 0 | COMMUNITY
Start: 2019-01-23 | End: 2019-02-02

## 2019-01-23 RX ORDER — MAGNESIUM SULFATE 500 MG/ML
2 VIAL (ML) INJECTION ONCE
Qty: 0 | Refills: 0 | Status: COMPLETED | OUTPATIENT
Start: 2019-01-23 | End: 2019-01-23

## 2019-01-23 RX ORDER — POTASSIUM CHLORIDE 20 MEQ
1 PACKET (EA) ORAL
Qty: 30 | Refills: 0 | OUTPATIENT
Start: 2019-01-23 | End: 2019-02-21

## 2019-01-23 RX ORDER — RIVAROXABAN 15 MG-20MG
15 KIT ORAL EVERY 24 HOURS
Qty: 0 | Refills: 0 | Status: DISCONTINUED | OUTPATIENT
Start: 2019-01-23 | End: 2019-01-25

## 2019-01-23 RX ORDER — DIPHENHYDRAMINE HCL 50 MG
25 CAPSULE ORAL EVERY 6 HOURS
Qty: 0 | Refills: 0 | Status: DISCONTINUED | OUTPATIENT
Start: 2019-01-23 | End: 2019-01-25

## 2019-01-23 RX ORDER — SACCHAROMYCES BOULARDII 250 MG
1 POWDER IN PACKET (EA) ORAL
Qty: 0 | Refills: 0 | COMMUNITY
Start: 2019-01-23

## 2019-01-23 RX ORDER — BUMETANIDE 0.25 MG/ML
1 INJECTION INTRAMUSCULAR; INTRAVENOUS
Qty: 30 | Refills: 0 | OUTPATIENT
Start: 2019-01-23 | End: 2019-02-21

## 2019-01-23 RX ORDER — NYSTATIN CREAM 100000 [USP'U]/G
1 CREAM TOPICAL
Qty: 0 | Refills: 0 | COMMUNITY
Start: 2019-01-23

## 2019-01-23 RX ADMIN — Medication 50 GRAM(S): at 08:26

## 2019-01-23 RX ADMIN — NYSTATIN CREAM 1 APPLICATION(S): 100000 CREAM TOPICAL at 22:05

## 2019-01-23 RX ADMIN — Medication 100 MILLIGRAM(S): at 13:01

## 2019-01-23 RX ADMIN — Medication 81 MILLIGRAM(S): at 12:24

## 2019-01-23 RX ADMIN — Medication 250 MILLIGRAM(S): at 06:13

## 2019-01-23 RX ADMIN — SODIUM CHLORIDE 333.33 MILLILITER(S): 9 INJECTION INTRAMUSCULAR; INTRAVENOUS; SUBCUTANEOUS at 09:14

## 2019-01-23 RX ADMIN — NYSTATIN CREAM 1 APPLICATION(S): 100000 CREAM TOPICAL at 06:13

## 2019-01-23 RX ADMIN — RIVAROXABAN 15 MILLIGRAM(S): KIT at 18:12

## 2019-01-23 RX ADMIN — Medication 5 MILLIGRAM(S): at 13:01

## 2019-01-23 RX ADMIN — Medication 1: at 12:24

## 2019-01-23 RX ADMIN — SODIUM CHLORIDE 75 MILLILITER(S): 9 INJECTION INTRAMUSCULAR; INTRAVENOUS; SUBCUTANEOUS at 16:21

## 2019-01-23 RX ADMIN — Medication 25 MILLIGRAM(S): at 22:59

## 2019-01-23 RX ADMIN — Medication 100 MILLIGRAM(S): at 06:39

## 2019-01-23 RX ADMIN — Medication 1: at 22:06

## 2019-01-23 RX ADMIN — Medication 1: at 08:11

## 2019-01-23 RX ADMIN — Medication 5 MILLIGRAM(S): at 22:05

## 2019-01-23 RX ADMIN — Medication 5 MILLIGRAM(S): at 06:13

## 2019-01-23 RX ADMIN — Medication 250 MILLIGRAM(S): at 18:12

## 2019-01-23 RX ADMIN — ATORVASTATIN CALCIUM 80 MILLIGRAM(S): 80 TABLET, FILM COATED ORAL at 22:05

## 2019-01-23 RX ADMIN — Medication 50 MILLIGRAM(S): at 00:37

## 2019-01-23 RX ADMIN — NYSTATIN CREAM 1 APPLICATION(S): 100000 CREAM TOPICAL at 13:01

## 2019-01-23 NOTE — DISCHARGE NOTE ADULT - ADDITIONAL INSTRUCTIONS
Follow up with PMD in 1 week.  Follow up with Cardio in 2 weeks.  Follow up with Renal in few weeks.   Avoid nephrotoxic medications.

## 2019-01-23 NOTE — DISCHARGE NOTE ADULT - HOSPITAL COURSE
84 y/o female with PMH of Afib on Xarelto, HTN, DM-2, CAD, and chronic pain came to the ED s/p fall. Patient said she wanted to lay on her bed while stepping on the step stool she slipped and fell; landed on her buttocks on the step stool with her right leg stuck between the stool and the bed. She pressed the life-alert and EMS came to get her to the ED. Patient was able to recall all the event; she did not hit her head. Patient seen in ED and noted to have laceration of right leg; this was sutured. Patient on antibiotic for cellulitis    Patient course was complicated by PRUDENCE which may have been from bumex and lisinopril vs ancef she was on IV. these medications were held and she was given IVF. She improved her Cr and is ready for discharge today. She will finish cellulitis treatment on clindamycin for 2 more days. She can restart bumex. She does not need ACE-I at this time since BP well controlled and this is now her second time with PRUDENCE while on it in the hospital. Cardiology to readdress medical optimization as outpatient.    01-25    139  |  108<H>  |  53.0<H>  ----------------------------<  131<H>  4.7   |  19.0<L>  |  0.92    Ca    8.0<L>      25 Jan 2019 08:31  Phos  4.0     01-24  Mg     1.9     01-24    ICU Vital Signs Last 24 Hrs  T(C): 36.9 (25 Jan 2019 04:38), Max: 36.9 (25 Jan 2019 04:38)  T(F): 98.4 (25 Jan 2019 04:38), Max: 98.4 (25 Jan 2019 04:38)  HR: 88 (25 Jan 2019 06:25) (59 - 88)  BP: 92/53 (25 Jan 2019 05:43) (91/59 - 115/60)  BP(mean): --  ABP: --  ABP(mean): --  RR: 20 (25 Jan 2019 04:38) (20 - 20)  SpO2: 94% (25 Jan 2019 06:25) (88% - 97%)    General: Elderly female lying in bed comfortably. No acute distress; obese  HEENT: EOMI. Clear conjunctivae. Dry mucus membrane  Neck: Supple. No JVD.   Chest: CTA bilaterally - no wheezing, rales or rhonchi.   Heart: Normal S1 & S2. RRR.   Abdomen: Obese. Soft. Non-tender. + BS  Ext: No pedal edema. No calf tenderness. Dressing on right leg at the site of laceration. bilateral hyperemia noted; not sure if this represents cellulitis. bilateral upper extremity swelling.  Neuro: AAO x 3. No focal deficit. No speech disorder  Skin: Warm and Dry. hyperemia on trunk.   Psychiatry: Normal mood and affect    discharge time 27 minutes

## 2019-01-23 NOTE — DISCHARGE NOTE ADULT - CARE PLAN
Principal Discharge DX:	Ambulatory dysfunction  Goal:	Improved ambulation  Assessment and plan of treatment:	Physical Therapy at [facility].  Follow up with PMD in 1 week.  Secondary Diagnosis:	Cellulitis of leg, right  Assessment and plan of treatment:	Continue antibiotics as prescribed.  Follow up with PMD in 1 week.  Secondary Diagnosis:	Chronic atrial fibrillation  Assessment and plan of treatment:	Continue home medications.  Follow up with Cardio in 2 weeks.  Secondary Diagnosis:	CHF (congestive heart failure)  Assessment and plan of treatment:	Continue home medications.  Hold Ramipril due to renal insufficiency.   Follow up with Cardio in 2 weeks.  Secondary Diagnosis:	CKD (chronic kidney disease)  Assessment and plan of treatment:	Hold Ramipril.  Avoid nephrotoxic medications.  Follow up with PMD in 1 week.  Follow up with Renal in few weeks.  Secondary Diagnosis:	Diabetes  Assessment and plan of treatment:	Continue home medications.  Follow up with PMD in 1 week.  Secondary Diagnosis:	HLD (hyperlipidemia)  Assessment and plan of treatment:	Continue home medications.  Follow up with PMD in 1 week. Principal Discharge DX:	Ambulatory dysfunction  Goal:	Improved ambulation  Assessment and plan of treatment:	Physical Therapy at [facility].  Follow up with PMD in 1 week.  Secondary Diagnosis:	Cellulitis of leg, right  Assessment and plan of treatment:	Continue antibiotics as prescribed. take with probiotic. end date 1/27/2019  Follow up with PMD in 1 week.  Secondary Diagnosis:	Chronic atrial fibrillation  Assessment and plan of treatment:	Continue home medications.  Follow up with Cardio in 2 weeks.  Secondary Diagnosis:	CHF (congestive heart failure)  Assessment and plan of treatment:	Continue home medications.  Hold Ramipril due to renal insufficiency and recurrent PRUDENCE  can restart bumex every other day  Follow up with Cardio in 2 weeks.  Secondary Diagnosis:	CKD (chronic kidney disease)  Assessment and plan of treatment:	Hold Ramipril.  Avoid nephrotoxic medications.  Follow up with PMD in 1 week.  Follow up with Renal in few weeks.  Secondary Diagnosis:	Diabetes  Assessment and plan of treatment:	Continue home medications.  Follow up with PMD in 1 week.  Secondary Diagnosis:	HLD (hyperlipidemia)  Assessment and plan of treatment:	Continue home medications.  Follow up with PMD in 1 week.

## 2019-01-23 NOTE — DISCHARGE NOTE ADULT - PLAN OF CARE
Improved ambulation Physical Therapy at [facility].  Follow up with PMD in 1 week. Continue antibiotics as prescribed.  Follow up with PMD in 1 week. Continue home medications.  Follow up with Cardio in 2 weeks. Continue home medications.  Hold Ramipril due to renal insufficiency.   Follow up with Cardio in 2 weeks. Hold Ramipril.  Avoid nephrotoxic medications.  Follow up with PMD in 1 week.  Follow up with Renal in few weeks. Continue home medications.  Follow up with PMD in 1 week. Continue antibiotics as prescribed. take with probiotic. end date 1/27/2019  Follow up with PMD in 1 week. Continue home medications.  Hold Ramipril due to renal insufficiency and recurrent PRUDENCE  can restart bumex every other day  Follow up with Cardio in 2 weeks.

## 2019-01-23 NOTE — DISCHARGE NOTE ADULT - MEDICATION SUMMARY - MEDICATIONS TO STOP TAKING
I will STOP taking the medications listed below when I get home from the hospital:  None I will STOP taking the medications listed below when I get home from the hospital:    ramipril 2.5 mg oral capsule  -- 1 cap(s) by mouth once a day    diphenhydrAMINE 50 mg oral capsule  -- 1 cap(s) by mouth every 4 hours, As needed, Rash and/or Itching

## 2019-01-23 NOTE — CONSULT NOTE ADULT - ASSESSMENT
PRUDENCE   Known prior H/o PRUDENCE/AIN related to Ancef in March 2018   D/C Ancef   Would also stop bactrim in setting of PRUDENCE   Urine eosinophils, Stacia   Hold Lisinopril   IVF   ID consult called by me  for alternative Abx   If renal function not better in am , will get sonogram   Bladder scan today PRUDENCE   Known prior H/o PRUDENCE/AIN related to Ancef in March 2018   D/C Ancef   Would also stop bactrim in setting of PRUDENCE   Urine eosinophils, Stacia   Hold Lisinopril   IVF   ID consult called by me  for alternative Abx   If renal function not better in am , will get sonogram and may need consider steroids   Bladder scan today

## 2019-01-23 NOTE — DISCHARGE NOTE ADULT - MEDICATION SUMMARY - MEDICATIONS TO TAKE
I will START or STAY ON the medications listed below when I get home from the hospital:    aspirin 81 mg oral delayed release tablet  -- 1 tab(s) by mouth once a day  -- Indication: For Prophylaxis    morphine 15 mg/12 hr oral tablet, extended release  -- 1 tab(s) by mouth every 12 hours, As needed, Moderate to sever pain  -- Indication: For Chronic Pain    acetaminophen 325 mg oral tablet  -- 2 tab(s) by mouth every 6 hours, As needed, Temp greater or equal to 38C (100.4F), Mild Pain (1 - 3), Moderate Pain (4 - 6)  -- Indication: For Pain or fever    Xarelto 15 mg oral tablet  -- 1 tab(s) by mouth once a day (in the evening)  -- Indication: For Chronic A. Fib    SITagliptin 50 mg oral tablet  -- 1 tab(s) by mouth once a day  -- Indication: For Diabetes    rosuvastatin 20 mg oral tablet  -- 1 tab(s) by mouth once a day (at bedtime)  -- Indication: For HLD    Metoprolol Tartrate 25 mg oral tablet  -- 0.5 tab(s) by mouth 2 times a day  -- Indication: For Chronic A. Fib / HTN    ProAir HFA 90 mcg/inh inhalation aerosol  -- 1 puff(s) inhaled every 4 hours  -- Indication: For SOB    nystatin 100,000 units/g topical powder  -- 1 application on skin 3 times a day  -- Indication: For Fungal Rash    bumetanide 0.5 mg oral tablet  -- 1 tab(s) by mouth every other day. Resume in few days once she starts eating normally.   -- Indication: For CHF    raNITIdine 150 mg oral capsule  -- 1 cap(s) by mouth once a day   -- Indication: For GERD (gastroesophageal reflux disease)    polyethylene glycol 3350 oral powder for reconstitution  -- 17 gram(s) by mouth once a day, As Needed  -- Indication: For Constipation    docusate sodium 100 mg oral capsule  -- 1 cap(s) by mouth 3 times a day, As Needed  -- Indication: For Constipation    senna oral tablet  -- 2 tab(s) by mouth once a day (at bedtime)  -- Indication: For Constipation    potassium chloride 20 mEq oral tablet, extended release  -- 1 tab(s) by mouth once a day. Resume with Bumetanide.   -- It is very important that you take or use this exactly as directed.  Do not skip doses or discontinue unless directed by your doctor.  Medication should be taken with plenty of water.  Take with food or milk.    -- Indication: For CHF    cyclobenzaprine 10 mg oral tablet  -- 1 tab(s) by mouth once a day (at bedtime), As needed, Muscle Spasm  -- Indication: For Muscle Spasm    saccharomyces boulardii lyo 250 mg oral capsule  -- 1 cap(s) by mouth 2 times a day  -- Indication: For C. Diff Prophylaxis    Bactrim  mg-160 mg oral tablet  -- 1 tab(s) by mouth 2 times a day   -- Avoid prolonged or excessive exposure to direct and/or artificial sunlight while taking this medication.  Finish all this medication unless otherwise directed by prescriber.  Medication should be taken with plenty of water.    -- Indication: For Cellulitis    oxybutynin 15 mg/24 hr oral tablet, extended release  -- 1 tab(s) by mouth once a day  -- Indication: For Urinary Symptoms I will START or STAY ON the medications listed below when I get home from the hospital:    aspirin 81 mg oral delayed release tablet  -- 1 tab(s) by mouth once a day  -- Indication: For Prophylaxis    morphine 15 mg/12 hr oral tablet, extended release  -- 1 tab(s) by mouth every 12 hours, As needed, Moderate to sever pain  -- Indication: For Chronic Pain    acetaminophen 325 mg oral tablet  -- 2 tab(s) by mouth every 6 hours, As needed, Temp greater or equal to 38C (100.4F), Mild Pain (1 - 3), Moderate Pain (4 - 6)  -- Indication: For Pain or fever    Xarelto 15 mg oral tablet  -- 1 tab(s) by mouth once a day (in the evening)  -- Indication: For Chronic A. Fib    SITagliptin 50 mg oral tablet  -- 1 tab(s) by mouth once a day  -- Indication: For Diabetes    rosuvastatin 20 mg oral tablet  -- 1 tab(s) by mouth once a day (at bedtime)  -- Indication: For HLD    Metoprolol Tartrate 25 mg oral tablet  -- 0.5 tab(s) by mouth 2 times a day  -- Indication: For Chronic A. Fib / HTN    ProAir HFA 90 mcg/inh inhalation aerosol  -- 1 puff(s) inhaled every 4 hours  -- Indication: For SOB    nystatin 100,000 units/g topical powder  -- 1 application on skin 3 times a day  -- Indication: For Fungal Rash    bumetanide 0.5 mg oral tablet  -- 1 tab(s) by mouth every other day. Resume in few days once she starts eating normally.   -- Indication: For CHF    raNITIdine 150 mg oral capsule  -- 1 cap(s) by mouth once a day   -- Indication: For GERD (gastroesophageal reflux disease)    polyethylene glycol 3350 oral powder for reconstitution  -- 17 gram(s) by mouth once a day, As Needed  -- Indication: For Constipation    docusate sodium 100 mg oral capsule  -- 1 cap(s) by mouth 3 times a day, As Needed  -- Indication: For Constipation    senna oral tablet  -- 2 tab(s) by mouth once a day (at bedtime)  -- Indication: For Constipation    clindamycin 300 mg oral capsule  -- 1 cap(s) by mouth every 6 hours  -- Indication: For Cellulitis of leg, right    potassium chloride 20 mEq oral tablet, extended release  -- 1 tab(s) by mouth once a day. Resume with Bumetanide.   -- It is very important that you take or use this exactly as directed.  Do not skip doses or discontinue unless directed by your doctor.  Medication should be taken with plenty of water.  Take with food or milk.    -- Indication: For CHF    cyclobenzaprine 10 mg oral tablet  -- 1 tab(s) by mouth once a day (at bedtime), As needed, Muscle Spasm  -- Indication: For Muscle Spasm    saccharomyces boulardii lyo 250 mg oral capsule  -- 1 cap(s) by mouth 2 times a day  -- Indication: For Cellulitis of leg, right    oxybutynin 15 mg/24 hr oral tablet, extended release  -- 1 tab(s) by mouth once a day  -- Indication: For Urinary Symptoms

## 2019-01-23 NOTE — PROGRESS NOTE ADULT - ASSESSMENT
82 y/o female with PMH of Afib on Xarelto, HTN, DM-2, CAD, and chronic pain came to the ED s/p fall.     1) Acute on chronic kidney injury  - In am IVF were given and BMP was repeated but there is no improvement.   - Bumex is on hold.   - Lisinopril was held this am.   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 Fib   Rate controlled  Continue Metoprolol and Xarelto    4) CAD/ HLD/ HTN  Continue Aspirin, Lipitor and Metoprolol   Hold Lisinopril  5) DM-2  Carb Consistent Diet  Start Accu checks and ISS  6) Chronic pain   Continue Morphine 15mg and Flexeril 10mg    DVT Prophylaxis -- Patient is on Xarelto     Dispo: LESLIE once renal function improves

## 2019-01-23 NOTE — DISCHARGE NOTE ADULT - PATIENT PORTAL LINK FT
You can access the [facility] Patient Portal, offered by [facility], by registering with the following website: [email or web address]

## 2019-01-23 NOTE — PROGRESS NOTE ADULT - SUBJECTIVE AND OBJECTIVE BOX
Fall    HPI:  82 y/o female with PMH of Afib on Xarelto, HTN, DM-2, CAD, and chronic pain came to the ED s/p fall. Patient said she wanted to lay on her bed while stepping on the step stool she slipped and fell; landed on her buttocks on the step stool with her right leg stuck between the stool and the bed. She pressed the life-alert and EMS came to get her to the ED. Patient was able to recall all the event; she did not hit her head. She has no chest pain, shortness of breath, blurry vision, dizziness, abdominal pain, change in bowel/urinary habit, nausea/vomit, weakness, numbness, tingling. (20 Jan 2019 19:24)    Interval History:  Patient was seen and examined at bedside around 8:15 am. Complaining of pain in right hand. Overnight was complaining of rash - a dose of benadryl was given.   Denies chest pain, palpitations, shortness of breath, headache, dizziness, visual symptoms, nausea, vomiting or abdominal pain.    ROS:  As per interval history otherwise unremarkable.    PHYSICAL EXAM:  Vital Signs   T(C): 37.1 (23 Jan 2019 12:57), Max: 37.3 (23 Jan 2019 05:23)  T(F): 98.8 (23 Jan 2019 12:57), Max: 99.1 (23 Jan 2019 05:23)  HR: 72 (23 Jan 2019 12:57) (66 - 73)  BP: 92/56 (23 Jan 2019 12:57) (92/56 - 101/56)  RR: 20 (23 Jan 2019 12:57) (18 - 20)  SpO2: 92% (23 Jan 2019 13:16) (86% - 92%)  General: Elderly female lying in bed comfortably. No acute distress  HEENT: EOMI. Clear conjunctivae. Dry mucus membrane  Neck: Supple. No JVD.   Chest: CTA bilaterally - no wheezing, rales or rhonchi.   Heart: Normal S1 & S2. RRR.   Abdomen: Obese. Soft. Non-tender. + BS  Ext: No pedal edema. No calf tenderness. Dressing on right leg at the site of laceration. Right hand slightly swollen and tender at the side of blood draw - no signs of cellulitis.   Neuro: AAO x 3. No focal deficit. No speech disorder  Skin: Warm and Dry. Redness on trunk.   Psychiatry: Normal mood and affect    MEDICATIONS  (STANDING):  aspirin enteric coated 81 milliGRAM(s) Oral daily  atorvastatin 80 milliGRAM(s) Oral at bedtime  ceFAZolin   IVPB 500 milliGRAM(s) IV Intermittent every 8 hours  docusate sodium 100 milliGRAM(s) Oral three times a day  lisinopril 10 milliGRAM(s) Oral daily  metoprolol tartrate 12.5 milliGRAM(s) Oral two times a day  oxybutynin 5 milliGRAM(s) Oral three times a day  senna 2 Tablet(s) Oral at bedtime    MEDICATIONS  (PRN):  acetaminophen   Tablet .. 650 milliGRAM(s) Oral every 6 hours PRN Temp greater or equal to 38C (100.4F), Mild Pain (1 - 3), Moderate Pain (4 - 6)  ALBUTerol    90 MICROgram(s) HFA Inhaler 1 Puff(s) Inhalation every 4 hours PRN Shortness of Breath and/or Wheezing  cyclobenzaprine 10 milliGRAM(s) Oral at bedtime PRN Muscle Spasm  morphine ER Tablet 15 milliGRAM(s) Oral every 12 hours PRN Moderate to sever pain    LABS:                        12.3   11.8  )-----------( 245      ( 23 Jan 2019 09:21 )             38.3     01-23    142  |  107  |  51.0<H>  ----------------------------<  177<H>  4.6   |  23.0  |  1.67<H>    Ca    8.0<L>      23 Jan 2019 13:21  Mg     1.6     01-23      RADIOLOGY & ADDITIONAL STUDIES:  Reviewed

## 2019-01-23 NOTE — CONSULT NOTE ADULT - SUBJECTIVE AND OBJECTIVE BOX
Patient is a 83y old  Female who presents with a chief complaint of Fall (2019 14:16)      HPI:  82 y/o female with PMH of Afib on Xarelto, HTN, DM-2, CAD, and chronic pain came to the ED s/p fall. Patient said she wanted to lay on her bed while stepping on the step stool she slipped and fell; landed on her buttocks on the step stool with her right leg stuck between the stool and the bed. She pressed the life-alert and EMS came to get her to the ED. Patient was able to recall all the event; she did not hit her head. She has no chest pain, shortness of breath, blurry vision, dizziness, abdominal pain, change in bowel/urinary habit, nausea/vomit, weakness, numbness, tingling. 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 ( developed rash and PRUDENCE )   Recognized this admit , Ancef stopped and placed  on Bactrim     PAST MEDICAL & SURGICAL HISTORY:  GERD (gastroesophageal reflux disease)  Diabetes  High cholesterol  Chronic anticoagulation  Chronic pain  Atrial fibrillation, unspecified type  CAD (coronary artery disease)  History of knee replacement, total, bilateral  History of hip replacement      FAMILY HISTORY:  Family history of essential hypertension (Father)  Family history of cerebrovascular accident (CVA) (Mother)      Social History:    MEDICATIONS  (STANDING):  aspirin enteric coated 81 milliGRAM(s) Oral daily  atorvastatin 80 milliGRAM(s) Oral at bedtime  dextrose 5%. 1000 milliLiter(s) (50 mL/Hr) IV Continuous <Continuous>  dextrose 50% Injectable 12.5 Gram(s) IV Push once  dextrose 50% Injectable 25 Gram(s) IV Push once  dextrose 50% Injectable 25 Gram(s) IV Push once  docusate sodium 100 milliGRAM(s) Oral three times a day  insulin lispro (HumaLOG) corrective regimen sliding scale   SubCutaneous Before meals and at bedtime  metoprolol tartrate 12.5 milliGRAM(s) Oral two times a day  nystatin Powder 1 Application(s) Topical three times a day  oxybutynin 5 milliGRAM(s) Oral three times a day  rivaroxaban 15 milliGRAM(s) Oral every 24 hours  saccharomyces boulardii 250 milliGRAM(s) Oral two times a day  senna 2 Tablet(s) Oral at bedtime  sodium chloride 0.9%. 1000 milliLiter(s) (75 mL/Hr) IV Continuous <Continuous>  trimethoprim  160 mG/sulfamethoxazole 800 mG 1 Tablet(s) Oral daily    MEDICATIONS  (PRN):  acetaminophen   Tablet .. 650 milliGRAM(s) Oral every 6 hours PRN Temp greater or equal to 38C (100.4F), Mild Pain (1 - 3), Moderate Pain (4 - 6)  ALBUTerol    90 MICROgram(s) HFA Inhaler 1 Puff(s) Inhalation every 4 hours PRN Shortness of Breath and/or Wheezing  cyclobenzaprine 10 milliGRAM(s) Oral at bedtime PRN Muscle Spasm  dextrose 40% Gel 15 Gram(s) Oral once PRN Blood Glucose LESS THAN 70 milliGRAM(s)/deciLiter  diphenhydrAMINE 25 milliGRAM(s) Oral every 6 hours PRN Rash and/or Itching  glucagon  Injectable 1 milliGRAM(s) IntraMuscular once PRN Glucose <70 milliGRAM(s)/deciLiter  morphine ER Tablet 15 milliGRAM(s) Oral every 12 hours PRN Moderate to sever pain      Allergies    Cipro (Rash)  latex (Rash)  penicillin (Nephrotoxicity; Rash)    Intolerances        Vital Signs Last 24 Hrs  T(C): 37.1 (2019 12:57), Max: 37.3 (2019 05:23)  T(F): 98.8 (2019 12:57), Max: 99.1 (2019 05:23)  HR: 72 (2019 12:57) (66 - 72)  BP: 92/56 (2019 12:57) (92/56 - 100/56)  BP(mean): --  RR: 20 (2019 12:57) (18 - 20)  SpO2: 92% (2019 13:16) (86% - 92%)  Daily     Daily Weight in k.2 (2019 13:34)  I&O's Detail    2019 07:01  -  2019 07:00  --------------------------------------------------------  IN:    Oral Fluid: 1080 mL  Total IN: 1080 mL    OUT:  Total OUT: 0 mL    Total NET: 1080 mL      2019 07:  -  2019 16:07  --------------------------------------------------------  IN:    Oral Fluid: 720 mL    Sodium Chloride 0.9% IV Bolus: 999 mL  Total IN: 1719 mL    OUT:  Total OUT: 0 mL    Total NET: 1719 mL        I&O's Summary    2019 07:  -  2019 07:00  --------------------------------------------------------  IN: 1080 mL / OUT: 0 mL / NET: 1080 mL    2019 07:  -  2019 16:07  --------------------------------------------------------  IN: 1719 mL / OUT: 0 mL / NET: 1719 mL        PHYSICAL EXAM:  GENERAL: NAD,   HEAD:  No facial edema   NECK: Supple, No JVD,  CHEST/LUNG: EAE , No wheeze   HEART: Regular rate and rhythm; No murmurs, rubs, or gallops  ABDOMEN: Obese , nontender   EXTREMITIES:  chronic edema , + red rash       LABS:                        12.3   11.8  )-----------( 245      ( 2019 09:21 )             38.3         142  |  107  |  51.0<H>  ----------------------------<  177<H>  4.6   |  23.0  |  1.67<H>    Ca    8.0<L>      2019 13:21  Mg     1.6               Magnesium, Serum: 1.6 mg/dL ( @ 06:44)          RADIOLOGY & ADDITIONAL TESTS:

## 2019-01-23 NOTE — DISCHARGE NOTE ADULT - SECONDARY DIAGNOSIS.
Cellulitis of leg, right Chronic atrial fibrillation CHF (congestive heart failure) CKD (chronic kidney disease) Diabetes HLD (hyperlipidemia)

## 2019-01-24 LAB
ANION GAP SERPL CALC-SCNC: 9 MMOL/L — SIGNIFICANT CHANGE UP (ref 5–17)
APPEARANCE UR: CLEAR — SIGNIFICANT CHANGE UP
BACTERIA # UR AUTO: NEGATIVE — SIGNIFICANT CHANGE UP
BILIRUB UR-MCNC: NEGATIVE — SIGNIFICANT CHANGE UP
BUN SERPL-MCNC: 63 MG/DL — HIGH (ref 8–20)
CALCIUM SERPL-MCNC: 7.9 MG/DL — LOW (ref 8.6–10.2)
CHLORIDE SERPL-SCNC: 108 MMOL/L — HIGH (ref 98–107)
CO2 SERPL-SCNC: 23 MMOL/L — SIGNIFICANT CHANGE UP (ref 22–29)
COLOR SPEC: YELLOW — SIGNIFICANT CHANGE UP
CREAT ?TM UR-MCNC: 147 MG/DL — SIGNIFICANT CHANGE UP
CREAT SERPL-MCNC: 1.47 MG/DL — HIGH (ref 0.5–1.3)
DIFF PNL FLD: NEGATIVE — SIGNIFICANT CHANGE UP
EOSINOPHIL # BLD AUTO: 0.6 K/UL — HIGH (ref 0–0.5)
EOSINOPHIL NFR BLD AUTO: 7.2 % — HIGH (ref 0–6)
EPI CELLS # UR: SIGNIFICANT CHANGE UP
GLUCOSE BLDC GLUCOMTR-MCNC: 148 MG/DL — HIGH (ref 70–99)
GLUCOSE BLDC GLUCOMTR-MCNC: 155 MG/DL — HIGH (ref 70–99)
GLUCOSE BLDC GLUCOMTR-MCNC: 177 MG/DL — HIGH (ref 70–99)
GLUCOSE BLDC GLUCOMTR-MCNC: 185 MG/DL — HIGH (ref 70–99)
GLUCOSE SERPL-MCNC: 145 MG/DL — HIGH (ref 70–115)
GLUCOSE UR QL: NEGATIVE MG/DL — SIGNIFICANT CHANGE UP
HCT VFR BLD CALC: 34.7 % — LOW (ref 37–47)
HGB BLD-MCNC: 11 G/DL — LOW (ref 12–16)
KETONES UR-MCNC: NEGATIVE — SIGNIFICANT CHANGE UP
LEUKOCYTE ESTERASE UR-ACNC: ABNORMAL
LYMPHOCYTES # BLD AUTO: 0.6 K/UL — LOW (ref 1–4.8)
LYMPHOCYTES # BLD AUTO: 8.4 % — LOW (ref 20–55)
MAGNESIUM SERPL-MCNC: 1.9 MG/DL — SIGNIFICANT CHANGE UP (ref 1.6–2.6)
MCHC RBC-ENTMCNC: 28.1 PG — SIGNIFICANT CHANGE UP (ref 27–31)
MCHC RBC-ENTMCNC: 31.7 G/DL — LOW (ref 32–36)
MCV RBC AUTO: 88.5 FL — SIGNIFICANT CHANGE UP (ref 81–99)
MONOCYTES # BLD AUTO: 0.3 K/UL — SIGNIFICANT CHANGE UP (ref 0–0.8)
MONOCYTES NFR BLD AUTO: 4.4 % — SIGNIFICANT CHANGE UP (ref 3–10)
NEUTROPHILS # BLD AUTO: 6.2 K/UL — SIGNIFICANT CHANGE UP (ref 1.8–8)
NEUTROPHILS NFR BLD AUTO: 79.7 % — HIGH (ref 37–73)
NITRITE UR-MCNC: NEGATIVE — SIGNIFICANT CHANGE UP
OSMOLALITY UR: 553 MOSM/KG — SIGNIFICANT CHANGE UP (ref 300–1000)
PH UR: 5 — SIGNIFICANT CHANGE UP (ref 5–8)
PHOSPHATE SERPL-MCNC: 4 MG/DL — SIGNIFICANT CHANGE UP (ref 2.4–4.7)
PLATELET # BLD AUTO: 206 K/UL — SIGNIFICANT CHANGE UP (ref 150–400)
POTASSIUM SERPL-MCNC: 4.6 MMOL/L — SIGNIFICANT CHANGE UP (ref 3.5–5.3)
POTASSIUM SERPL-SCNC: 4.6 MMOL/L — SIGNIFICANT CHANGE UP (ref 3.5–5.3)
POTASSIUM UR-SCNC: 62 MMOL/L — SIGNIFICANT CHANGE UP
PROT ?TM UR-MCNC: 13 MG/DL — HIGH (ref 0–12)
PROT UR-MCNC: 15 MG/DL
PROT/CREAT UR-RTO: 0.1 RATIO — SIGNIFICANT CHANGE UP
RBC # BLD: 3.92 M/UL — LOW (ref 4.4–5.2)
RBC # FLD: 15.5 % — SIGNIFICANT CHANGE UP (ref 11–15.6)
RBC CASTS # UR COMP ASSIST: SIGNIFICANT CHANGE UP /HPF (ref 0–4)
SODIUM SERPL-SCNC: 140 MMOL/L — SIGNIFICANT CHANGE UP (ref 135–145)
SODIUM UR-SCNC: 68 MMOL/L — SIGNIFICANT CHANGE UP
SP GR SPEC: 1.02 — SIGNIFICANT CHANGE UP (ref 1.01–1.02)
UROBILINOGEN FLD QL: NEGATIVE MG/DL — SIGNIFICANT CHANGE UP
WBC # BLD: 7.8 K/UL — SIGNIFICANT CHANGE UP (ref 4.8–10.8)
WBC # FLD AUTO: 7.8 K/UL — SIGNIFICANT CHANGE UP (ref 4.8–10.8)
WBC UR QL: SIGNIFICANT CHANGE UP

## 2019-01-24 PROCEDURE — 99223 1ST HOSP IP/OBS HIGH 75: CPT

## 2019-01-24 PROCEDURE — 99232 SBSQ HOSP IP/OBS MODERATE 35: CPT

## 2019-01-24 RX ADMIN — Medication 12.5 MILLIGRAM(S): at 17:28

## 2019-01-24 RX ADMIN — Medication 250 MILLIGRAM(S): at 06:06

## 2019-01-24 RX ADMIN — Medication 25 MILLIGRAM(S): at 14:43

## 2019-01-24 RX ADMIN — Medication 5 MILLIGRAM(S): at 22:10

## 2019-01-24 RX ADMIN — NYSTATIN CREAM 1 APPLICATION(S): 100000 CREAM TOPICAL at 22:10

## 2019-01-24 RX ADMIN — Medication 12.5 MILLIGRAM(S): at 06:06

## 2019-01-24 RX ADMIN — Medication 81 MILLIGRAM(S): at 12:08

## 2019-01-24 RX ADMIN — SODIUM CHLORIDE 75 MILLILITER(S): 9 INJECTION INTRAMUSCULAR; INTRAVENOUS; SUBCUTANEOUS at 18:07

## 2019-01-24 RX ADMIN — Medication 1: at 22:11

## 2019-01-24 RX ADMIN — ATORVASTATIN CALCIUM 80 MILLIGRAM(S): 80 TABLET, FILM COATED ORAL at 22:10

## 2019-01-24 RX ADMIN — NYSTATIN CREAM 1 APPLICATION(S): 100000 CREAM TOPICAL at 06:07

## 2019-01-24 RX ADMIN — Medication 25 MILLIGRAM(S): at 20:41

## 2019-01-24 RX ADMIN — Medication 250 MILLIGRAM(S): at 17:29

## 2019-01-24 RX ADMIN — Medication 25 MILLIGRAM(S): at 08:27

## 2019-01-24 RX ADMIN — Medication 5 MILLIGRAM(S): at 13:31

## 2019-01-24 RX ADMIN — NYSTATIN CREAM 1 APPLICATION(S): 100000 CREAM TOPICAL at 13:31

## 2019-01-24 RX ADMIN — SODIUM CHLORIDE 75 MILLILITER(S): 9 INJECTION INTRAMUSCULAR; INTRAVENOUS; SUBCUTANEOUS at 06:05

## 2019-01-24 RX ADMIN — Medication 1: at 17:29

## 2019-01-24 RX ADMIN — RIVAROXABAN 15 MILLIGRAM(S): KIT at 17:29

## 2019-01-24 RX ADMIN — Medication 300 MILLIGRAM(S): at 17:29

## 2019-01-24 RX ADMIN — Medication 5 MILLIGRAM(S): at 06:06

## 2019-01-24 RX ADMIN — Medication 1: at 12:08

## 2019-01-24 NOTE — CONSULT NOTE ADULT - ASSESSMENT
82 y/o woman with PMH of Afib on Xarelto, HTN, DM2, obesity and CAD was admitted on 1/20 after a fall. She injured her right lower leg with the fall so was started on cefazolin on admission for cellulitis. Since yesterday has a skin rash with pruritus so Cefazolin was stopped. She is allergic to Cipro and PCN and also with CKD Bactrim is not the best choice.     Cellulitis  PCN and Cephalosporin allergy    - Will stop bactrim  - Start Clindamycin 300mg po q6h for now  - Watch for diarrhea   - Wound care and dressing   - Trend WBC and Tm  - If fever more than 100.8 send cultures.     Will follow.

## 2019-01-24 NOTE — PROGRESS NOTE ADULT - SUBJECTIVE AND OBJECTIVE BOX
NEPHROLOGY INTERVAL HPI/OVERNIGHT EVENTS:    Examined earlier    MEDICATIONS  (STANDING):  aspirin enteric coated 81 milliGRAM(s) Oral daily  atorvastatin 80 milliGRAM(s) Oral at bedtime  dextrose 5%. 1000 milliLiter(s) (50 mL/Hr) IV Continuous <Continuous>  dextrose 50% Injectable 12.5 Gram(s) IV Push once  dextrose 50% Injectable 25 Gram(s) IV Push once  dextrose 50% Injectable 25 Gram(s) IV Push once  docusate sodium 100 milliGRAM(s) Oral three times a day  insulin lispro (HumaLOG) corrective regimen sliding scale   SubCutaneous Before meals and at bedtime  metoprolol tartrate 12.5 milliGRAM(s) Oral two times a day  nystatin Powder 1 Application(s) Topical three times a day  oxybutynin 5 milliGRAM(s) Oral three times a day  rivaroxaban 15 milliGRAM(s) Oral every 24 hours  saccharomyces boulardii 250 milliGRAM(s) Oral two times a day  senna 2 Tablet(s) Oral at bedtime  sodium chloride 0.9%. 1000 milliLiter(s) (75 mL/Hr) IV Continuous <Continuous>    MEDICATIONS  (PRN):  acetaminophen   Tablet .. 650 milliGRAM(s) Oral every 6 hours PRN Temp greater or equal to 38C (100.4F), Mild Pain (1 - 3), Moderate Pain (4 - 6)  ALBUTerol    90 MICROgram(s) HFA Inhaler 1 Puff(s) Inhalation every 4 hours PRN Shortness of Breath and/or Wheezing  cyclobenzaprine 10 milliGRAM(s) Oral at bedtime PRN Muscle Spasm  dextrose 40% Gel 15 Gram(s) Oral once PRN Blood Glucose LESS THAN 70 milliGRAM(s)/deciLiter  diphenhydrAMINE 25 milliGRAM(s) Oral every 6 hours PRN Rash and/or Itching  glucagon  Injectable 1 milliGRAM(s) IntraMuscular once PRN Glucose <70 milliGRAM(s)/deciLiter  morphine ER Tablet 15 milliGRAM(s) Oral every 12 hours PRN Moderate to sever pain      Allergies    Cipro (Rash)  latex (Rash)  penicillin (Nephrotoxicity; Rash)    Intolerances        Vital Signs Last 24 Hrs  T(C): 36.7 (2019 11:26), Max: 36.9 (2019 18:09)  T(F): 98 (2019 11:26), Max: 98.5 (2019 18:09)  HR: 62 (2019 11:26) (62 - 90)  BP: 115/60 (2019 11:26) (91/54 - 119/51)  BP(mean): --  RR: 20 (2019 11:26) (18 - 20)  SpO2: 92% (2019 05:10) (92% - 93%)  Daily     Daily     PHYSICAL EXAM:  GENERAL: NAD,   HEAD:  No facial edema   NECK: Supple, No JVD,  CHEST/LUNG: EAE , No wheeze   HEART: Regular rate and rhythm; No murmurs, rubs, or gallops  ABDOMEN: Obese , nontender   EXTREMITIES:  chronic edema , + red rash       LABS:                        11.0   7.8   )-----------( 206      ( 2019 07:34 )             34.7     24    140  |  108<H>  |  63.0<H>  ----------------------------<  145<H>  4.6   |  23.0  |  1.47<H>    Ca    7.9<L>      2019 07:34  Phos  4.0       Mg     1.9             Urinalysis Basic - ( 2019 04:46 )    Color: Yellow / Appearance: Clear / S.020 / pH: x  Gluc: x / Ketone: Negative  / Bili: Negative / Urobili: Negative mg/dL   Blood: x / Protein: 15 mg/dL / Nitrite: Negative   Leuk Esterase: Trace / RBC: 0-2 /HPF / WBC 3-5   Sq Epi: x / Non Sq Epi: Occasional / Bacteria: Negative      Magnesium, Serum: 1.9 mg/dL ( @ 07:34)  Phosphorus Level, Serum: 4.0 mg/dL ( @ 07:34)          RADIOLOGY & ADDITIONAL TESTS:

## 2019-01-24 NOTE — PROGRESS NOTE ADULT - ASSESSMENT
84 y/o female with PMH of Afib on Xarelto, HTN, DM-2, CAD, and chronic pain came to the ED s/p fall.     1) Acute on chronic kidney injury; likely pre-renal from lisinopril/bumex; now on hold  - Cr improving  - Bumex is on hold.   - d/c lisinopril; her BP is well controlled  - no longer on ancef - Urine studies including Eos  - Renal Consult recs appreciated    2) Ambulatory Dysfunction secondary to Fall  XR pelvis: no fracture   Pain control  PT recommended LESLIE    3) RLE Skin laceration with cellulitis  D/C Ancef  Start Bactrim DS  Continue Florastor  Doppler negative for DVT    3) Chronic A. Fib   Rate controlled  Continue Metoprolol and Xarelto      4) CAD/ HLD/ HTN  Continue Aspirin, Lipitor and Metoprolol   Hold Lisinopril    5) DM-2  Carb Consistent Diet  Start Accu checks and ISS    6) Chronic pain   Continue Morphine 15mg and Flexeril 10mg    DVT Prophylaxis -- Patient is on Xarelto     Dispo: LESLIE once renal function improves

## 2019-01-24 NOTE — PROGRESS NOTE ADULT - SUBJECTIVE AND OBJECTIVE BOX
HPI:  82 y/o female with PMH of Afib on Xarelto, HTN, DM-2, CAD, and chronic pain came to the ED s/p fall. Patient said she wanted to lay on her bed while stepping on the step stool she slipped and fell; landed on her buttocks on the step stool with her right leg stuck between the stool and the bed. She pressed the life-alert and EMS came to get her to the ED. Patient was able to recall all the event; she did not hit her head. She has no chest pain, shortness of breath, blurry vision, dizziness, abdominal pain, change in bowel/urinary habit, nausea/vomit, weakness, numbness, tingling. (20 Jan 2019 19:24)    Interval History:  No acute events. Patient without complaints. She'd like to be discharged. Explained to her that medications may have induced PRUDENCE. Will continue with fluids as there is improvement. Patient told if there is improvement that she could go to [facility] tomorrow or day after. She agreed with plan.    PHYSICAL EXAM:  ICU Vital Signs Last 24 Hrs  T(C): 36.7 (24 Jan 2019 11:26), Max: 36.9 (23 Jan 2019 18:09)  T(F): 98 (24 Jan 2019 11:26), Max: 98.5 (23 Jan 2019 18:09)  HR: 62 (24 Jan 2019 11:26) (62 - 90)  BP: 115/60 (24 Jan 2019 11:26) (91/54 - 119/51)  BP(mean): --  ABP: --  ABP(mean): --  RR: 20 (24 Jan 2019 11:26) (18 - 20)  SpO2: 92% (24 Jan 2019 05:10) (92% - 93%)    General: Elderly female lying in bed comfortably. No acute distress; obese  HEENT: EOMI. Clear conjunctivae. Dry mucus membrane  Neck: Supple. No JVD.   Chest: CTA bilaterally - no wheezing, rales or rhonchi.   Heart: Normal S1 & S2. RRR.   Abdomen: Obese. Soft. Non-tender. + BS  Ext: No pedal edema. No calf tenderness. Dressing on right leg at the site of laceration. bilateral hyperemia noted; not sure if this represents cellulitis. bilateral upper extremity swelling.  Neuro: AAO x 3. No focal deficit. No speech disorder  Skin: Warm and Dry. hyperemia on trunk.   Psychiatry: Normal mood and affect    MEDICATIONS  (STANDING):  aspirin enteric coated 81 milliGRAM(s) Oral daily  atorvastatin 80 milliGRAM(s) Oral at bedtime  ceFAZolin   IVPB 500 milliGRAM(s) IV Intermittent every 8 hours  docusate sodium 100 milliGRAM(s) Oral three times a day  lisinopril 10 milliGRAM(s) Oral daily  metoprolol tartrate 12.5 milliGRAM(s) Oral two times a day  oxybutynin 5 milliGRAM(s) Oral three times a day  senna 2 Tablet(s) Oral at bedtime    MEDICATIONS  (PRN):  acetaminophen   Tablet .. 650 milliGRAM(s) Oral every 6 hours PRN Temp greater or equal to 38C (100.4F), Mild Pain (1 - 3), Moderate Pain (4 - 6)  ALBUTerol    90 MICROgram(s) HFA Inhaler 1 Puff(s) Inhalation every 4 hours PRN Shortness of Breath and/or Wheezing  cyclobenzaprine 10 milliGRAM(s) Oral at bedtime PRN Muscle Spasm  morphine ER Tablet 15 milliGRAM(s) Oral every 12 hours PRN Moderate to sever pain    LABS:                        11.0   7.8   )-----------( 206      ( 24 Jan 2019 07:34 )             34.7     01-24    140  |  108<H>  |  63.0<H>  ----------------------------<  145<H>  4.6   |  23.0  |  1.47<H>    Ca    7.9<L>      24 Jan 2019 07:34  Phos  4.0     01-24  Mg     1.9     01-24    RADIOLOGY & ADDITIONAL STUDIES:  Reviewed

## 2019-01-24 NOTE — CONSULT NOTE ADULT - SUBJECTIVE AND OBJECTIVE BOX
[facility]  INFECTIOUS DISEASES AND INTERNAL MEDICINE at [place]  =======================================================  Loyd Perkins MD  Diplomates American Board of Internal Medicine and Infectious Diseases  =======================================================    [id number]  FLEX MORALES     CC: Fall     HPI:  82 y/o woman with PMH of Afib on Xarelto, HTN, DM2, obesity and CAD was admitted on  after a fall. She injured her right lower leg with the fall so was started on cefazolin on admission for cellulitis. Since yesterday has a skin rash with pruritus so Cefazolin ws stopped for possible allergy and ID was called for recommendation.   She has rash mostly on torso and arms. Rash is erythematous with severe pruritus. No PCN allergy or any other drug allergy in the past. No fever.       PAST MEDICAL & SURGICAL HISTORY:  GERD (gastroesophageal reflux disease)  Diabetes  High cholesterol  Chronic anticoagulation  Chronic pain  Atrial fibrillation, unspecified type  CAD (coronary artery disease)  History of knee replacement, total, bilateral  History of hip replacement    Social Hx: No smoking, ETOH or drugs, lives at home alone but her family members are her neighbors     FAMILY HISTORY:  Family history of essential hypertension (Father)  Family history of cerebrovascular accident (CVA) (Mother)      Allergies  Cipro (Rash)  latex (Rash)  penicillin (Nephrotoxicity; Rash)    Antibiotics:  Bactrim    REVIEW OF SYSTEMS:  as above    Physical Exam:  Vital Signs Last 24 Hrs  T(C): 36.7 (2019 11:26), Max: 36.9 (2019 18:09)  T(F): 98 (2019 11:26), Max: 98.5 (2019 18:09)  HR: 62 (2019 11:26) (62 - 90)  BP: 115/60 (2019 11:26) (91/54 - 119/51)  RR: 20 (2019 11:26) (18 - 20)  SpO2: 92% (2019 05:10) (92% - 93%)  GEN: NAD, obese   HEENT: normocephalic and atraumatic. EOMI. PERRL.    NECK: Supple.  No lymphadenopathy   LUNGS: Clear to auscultation.  HEART: Regular rate and rhythm without murmur.  ABDOMEN: Soft, nontender, and nondistended.  Positive bowel sounds.    : No CVA tenderness  EXTREMITIES: both legs with edema and chronic skin changes R leg with an open wound, bleeding with removing dressing, erythema and swelling around it. no purulent discharge   NEUROLOGIC: grossly intact.  PSYCHIATRIC: Appropriate affect .  SKIN: No ulceration or induration present.    Labs:      140  |  108<H>  |  63.0<H>  ----------------------------<  145<H>  4.6   |  23.0  |  1.47<H>    Ca    7.9<L>      2019 07:34  Phos  4.0       Mg     1.9                             11.0   7.8   )-----------( 206      ( 2019 07:34 )             34.7     Urinalysis Basic - ( 2019 04:46 )    Color: Yellow / Appearance: Clear / S.020 / pH: x  Gluc: x / Ketone: Negative  / Bili: Negative / Urobili: Negative mg/dL   Blood: x / Protein: 15 mg/dL / Nitrite: Negative   Leuk Esterase: Trace / RBC: 0-2 /HPF / WBC 3-5   Sq Epi: x / Non Sq Epi: Occasional / Bacteria: Negative    CARDIAC MARKERS ( 2019 18:12 )  x     / x     / 82 U/L / x     / x        RECENT CULTURES:   @ 19:59 .Blood     No growth at 48 hours    All imaging and other data have been reviewed.

## 2019-01-25 VITALS
HEART RATE: 60 BPM | TEMPERATURE: 98 F | OXYGEN SATURATION: 92 % | SYSTOLIC BLOOD PRESSURE: 115 MMHG | DIASTOLIC BLOOD PRESSURE: 62 MMHG | RESPIRATION RATE: 18 BRPM

## 2019-01-25 LAB
ANION GAP SERPL CALC-SCNC: 12 MMOL/L — SIGNIFICANT CHANGE UP (ref 5–17)
BUN SERPL-MCNC: 53 MG/DL — HIGH (ref 8–20)
CALCIUM SERPL-MCNC: 8 MG/DL — LOW (ref 8.6–10.2)
CHLORIDE SERPL-SCNC: 108 MMOL/L — HIGH (ref 98–107)
CO2 SERPL-SCNC: 19 MMOL/L — LOW (ref 22–29)
CREAT SERPL-MCNC: 0.92 MG/DL — SIGNIFICANT CHANGE UP (ref 0.5–1.3)
GLUCOSE BLDC GLUCOMTR-MCNC: 124 MG/DL — HIGH (ref 70–99)
GLUCOSE BLDC GLUCOMTR-MCNC: 157 MG/DL — HIGH (ref 70–99)
GLUCOSE SERPL-MCNC: 131 MG/DL — HIGH (ref 70–115)
POTASSIUM SERPL-MCNC: 4.7 MMOL/L — SIGNIFICANT CHANGE UP (ref 3.5–5.3)
POTASSIUM SERPL-SCNC: 4.7 MMOL/L — SIGNIFICANT CHANGE UP (ref 3.5–5.3)
SODIUM SERPL-SCNC: 139 MMOL/L — SIGNIFICANT CHANGE UP (ref 135–145)

## 2019-01-25 PROCEDURE — 85027 COMPLETE CBC AUTOMATED: CPT

## 2019-01-25 PROCEDURE — 90471 IMMUNIZATION ADMIN: CPT

## 2019-01-25 PROCEDURE — 94640 AIRWAY INHALATION TREATMENT: CPT

## 2019-01-25 PROCEDURE — 82570 ASSAY OF URINE CREATININE: CPT

## 2019-01-25 PROCEDURE — 85610 PROTHROMBIN TIME: CPT

## 2019-01-25 PROCEDURE — 12001 RPR S/N/AX/GEN/TRNK 2.5CM/<: CPT | Mod: RT

## 2019-01-25 PROCEDURE — 99239 HOSP IP/OBS DSCHRG MGMT >30: CPT

## 2019-01-25 PROCEDURE — 93971 EXTREMITY STUDY: CPT

## 2019-01-25 PROCEDURE — 72170 X-RAY EXAM OF PELVIS: CPT

## 2019-01-25 PROCEDURE — 36415 COLL VENOUS BLD VENIPUNCTURE: CPT

## 2019-01-25 PROCEDURE — 83735 ASSAY OF MAGNESIUM: CPT

## 2019-01-25 PROCEDURE — 99285 EMERGENCY DEPT VISIT HI MDM: CPT | Mod: 25

## 2019-01-25 PROCEDURE — 90715 TDAP VACCINE 7 YRS/> IM: CPT

## 2019-01-25 PROCEDURE — 82962 GLUCOSE BLOOD TEST: CPT

## 2019-01-25 PROCEDURE — 84133 ASSAY OF URINE POTASSIUM: CPT

## 2019-01-25 PROCEDURE — 81001 URINALYSIS AUTO W/SCOPE: CPT

## 2019-01-25 PROCEDURE — 99232 SBSQ HOSP IP/OBS MODERATE 35: CPT

## 2019-01-25 PROCEDURE — 84156 ASSAY OF PROTEIN URINE: CPT

## 2019-01-25 PROCEDURE — 87040 BLOOD CULTURE FOR BACTERIA: CPT

## 2019-01-25 PROCEDURE — 73590 X-RAY EXAM OF LOWER LEG: CPT

## 2019-01-25 PROCEDURE — 83935 ASSAY OF URINE OSMOLALITY: CPT

## 2019-01-25 PROCEDURE — 82550 ASSAY OF CK (CPK): CPT

## 2019-01-25 PROCEDURE — 93005 ELECTROCARDIOGRAM TRACING: CPT

## 2019-01-25 PROCEDURE — 84100 ASSAY OF PHOSPHORUS: CPT

## 2019-01-25 PROCEDURE — 84300 ASSAY OF URINE SODIUM: CPT

## 2019-01-25 PROCEDURE — 80048 BASIC METABOLIC PNL TOTAL CA: CPT

## 2019-01-25 PROCEDURE — 80053 COMPREHEN METABOLIC PANEL: CPT

## 2019-01-25 RX ADMIN — Medication 300 MILLIGRAM(S): at 13:06

## 2019-01-25 RX ADMIN — Medication 1: at 11:53

## 2019-01-25 RX ADMIN — ALBUTEROL 1 PUFF(S): 90 AEROSOL, METERED ORAL at 06:25

## 2019-01-25 RX ADMIN — Medication 300 MILLIGRAM(S): at 05:39

## 2019-01-25 RX ADMIN — Medication 5 MILLIGRAM(S): at 13:05

## 2019-01-25 RX ADMIN — SODIUM CHLORIDE 75 MILLILITER(S): 9 INJECTION INTRAMUSCULAR; INTRAVENOUS; SUBCUTANEOUS at 05:39

## 2019-01-25 RX ADMIN — Medication 25 MILLIGRAM(S): at 04:36

## 2019-01-25 RX ADMIN — Medication 12.5 MILLIGRAM(S): at 05:40

## 2019-01-25 RX ADMIN — Medication 81 MILLIGRAM(S): at 11:54

## 2019-01-25 RX ADMIN — NYSTATIN CREAM 1 APPLICATION(S): 100000 CREAM TOPICAL at 13:05

## 2019-01-25 RX ADMIN — Medication 25 MILLIGRAM(S): at 11:54

## 2019-01-25 RX ADMIN — Medication 300 MILLIGRAM(S): at 00:02

## 2019-01-25 RX ADMIN — Medication 250 MILLIGRAM(S): at 05:39

## 2019-01-25 RX ADMIN — Medication 5 MILLIGRAM(S): at 05:39

## 2019-01-25 RX ADMIN — NYSTATIN CREAM 1 APPLICATION(S): 100000 CREAM TOPICAL at 05:39

## 2019-01-25 RX ADMIN — Medication 100 MILLIGRAM(S): at 11:54

## 2019-01-25 NOTE — PROGRESS NOTE ADULT - ASSESSMENT
84 y/o woman with PMH of Afib on Xarelto, HTN, DM2, obesity and CAD was admitted on 1/20 after a fall. She injured her right lower leg with the fall so was started on cefazolin on admission for cellulitis. Since yesterday has a skin rash with pruritus so Cefazolin was stopped. She is allergic to Cipro and PCN.     Cellulitis  PCN and Cephalosporin allergy  Rash     - Rash has improved.   - Continue Clindamycin 300mg po q6h for one week.  - Re-evaluate at the end of treatment to make sure there is no cellulitis. If still remaining can extend the course.   - Watch for diarrhea   - Wound care and dressing   - Trend WBC and Tm  - If fever more than 100.8 send cultures.     Will sign off please  call with any question.

## 2019-01-25 NOTE — PROGRESS NOTE ADULT - SUBJECTIVE AND OBJECTIVE BOX
[facility]  INFECTIOUS DISEASES AND INTERNAL MEDICINE at [place]  =======================================================  Loyd Perkins MD  Diplomates American Board of Internal Medicine and Infectious Diseases  =======================================================    [id number]  FLEX MORALES     Follow up: Right lower leg injury and cellulitis     Afebrile, feels better. no pain in leg. Going to [facility] [place] today.       PAST MEDICAL & SURGICAL HISTORY:  GERD (gastroesophageal reflux disease)  Diabetes  High cholesterol  Chronic anticoagulation  Chronic pain  Atrial fibrillation, unspecified type  CAD (coronary artery disease)  History of knee replacement, total, bilateral  History of hip replacement    Social Hx: No smoking, ETOH or drugs, lives at home alone but her family members are her neighbors     FAMILY HISTORY:  Family history of essential hypertension (Father)  Family history of cerebrovascular accident (CVA) (Mother)      Allergies  Cipro (Rash)  latex (Rash)  penicillin (Nephrotoxicity; Rash)    Antibiotics:  Clindamycin     REVIEW OF SYSTEMS:  as above    Physical Exam:  Vital Signs Last 24 Hrs  T(C): 36.9 (2019 13:49), Max: 36.9 (2019 04:38)  T(F): 98.5 (2019 13:49), Max: 98.5 (2019 13:49)  HR: 60 (2019 13:49) (59 - 88)  BP: 115/62 (2019 13:49) (91/59 - 137/63)  RR: 18 (2019 13:49) (18 - 20)  SpO2: 92% (2019 13:49) (88% - 97%)  GEN: NAD, obese   HEENT: normocephalic and atraumatic. EOMI. PERRL.    NECK: Supple.  No lymphadenopathy   LUNGS: Clear to auscultation.  HEART: Regular rate and rhythm without murmur.  ABDOMEN: Soft, nontender, and nondistended.  Positive bowel sounds.    : No CVA tenderness  EXTREMITIES: both legs with edema and chronic skin changes R leg with an open wound, bleeding with removing dressing, erythema and swelling around it. no purulent discharge   NEUROLOGIC: grossly intact.  PSYCHIATRIC: Appropriate affect .  SKIN: rash has improved.     Labs:      139  |  108<H>  |  53.0<H>  ----------------------------<  131<H>  4.7   |  19.0<L>  |  0.92    Ca    8.0<L>      2019 08:31  Phos  4.0       Mg     1.9                             11.0   7.8   )-----------( 206      ( 2019 07:34 )             34.7     Urinalysis Basic - ( 2019 04:46 )    Color: Yellow / Appearance: Clear / S.020 / pH: x  Gluc: x / Ketone: Negative  / Bili: Negative / Urobili: Negative mg/dL   Blood: x / Protein: 15 mg/dL / Nitrite: Negative   Leuk Esterase: Trace / RBC: 0-2 /HPF / WBC 3-5   Sq Epi: x / Non Sq Epi: Occasional / Bacteria: Negative    CARDIAC MARKERS ( 2019 18:12 )  x     / x     / 82 U/L / x     / x        RECENT CULTURES:   @ 19:59 .Blood     No growth at 48 hours    All imaging and other data have been reviewed.

## 2019-01-26 LAB
CULTURE RESULTS: SIGNIFICANT CHANGE UP
SPECIMEN SOURCE: SIGNIFICANT CHANGE UP

## 2022-06-30 NOTE — H&P ADULT - NSHPPOACENTRALVENOUSCATHETER_GEN_ALL_CORE
Tristian Rubio)  Urology  [facility]  [address], [address] [place] [address]  Phone: [phone]  Fax: [phone]  Follow Up Time: 1 week    [facility],   [address] [place] [address]  Phone: [phone]  Fax: (   )    -  Follow Up Time: 1 week  
no

## 2022-08-10 NOTE — PHYSICAL THERAPY INITIAL EVALUATION ADULT - GENERAL OBSERVATIONS, REHAB EVAL
Pt. greeted resting on stretcher in ED, agreeable to PT PAST MEDICAL HISTORY:  IV drug abuse     MSSA bacteremia     Vertebral osteomyelitis

## 2023-03-21 NOTE — PHYSICAL THERAPY INITIAL EVALUATION ADULT - ORIENTATION, REHAB EVAL
My Heart Failure Action Plan  Name: Betty Tee   YOB: 1939  Date: 3/21/2023   My doctor:   Ilene Tristan [address]  [facility] [address] [phone] My Diagnosis: HF-pEF (EF >40)  My Ejection Fraction:   Lab Results   Component Value Date    LVEF 55-60% 09/09/2021     Over 50%  My Exercise Goal: Start exercise slowly - to begin, do a few minutes of exercise, several times a day. Increase your time and speed [id number] to build tolerance, with a goal of 30 minutes of exercise daily. Steady, slow, and consistent exercise is both safe and healthy. Stop and rest when you feel tired or become short of breath. Do not push yourself on days when you don t feel well.       My Weight Plan:   Wt Readings from Last 2 Encounters:   03/08/23 81.4 kg (179 lb 6.4 oz)   01/13/23 80.7 kg (178 lb)     Weigh yourself daily using the same scale. If you gain more than 2 pounds in 24 hours or 5 pounds in a week call the clinic    My Diet Goal: No added salt    Emergency Room Visits:    Our goal is to improve your quality of life and help you avoid a visit to the emergency room or hospital.  If we work together, we can achieve this goal. But, if you feel you need to call 911 or go to the emergency room, please do so.  If you go to the emergency room, please bring your list of medicines and your daily weight chart with you.       GREEN ZONE     Doing well today    Weight gained is no more than 2 pounds a day or 5 pounds a week.    No swelling in feet, ankles, legs or stomach.    No more swelling than usual.    No more trouble breathing than usual.    No change in my sleep.    No other problems. Actions:    I am doing fine. I will take my medicine, follow my diet, see my doctor, exercise, and watch for symptoms.           YELLOW ZONE         Having a bad day or flare up    Weight gain of more than 2 pounds in one day or 5 pounds in one  week.    New swelling in ankle, leg, knee or thigh.    Bloating in belly, pants feel tighter.    Swelling in hands or face.    Coughing or trouble breathing while walking or talking.    Harder to breathe last night.    Have trouble sleeping, wake up short of breath.    Much more tired than usual.    Not eating.    Nausea, vomiting, or diarrhea    Pain in my chest or bad  leg cramps.    Feel weak or dizzy. Actions:    I need to take action and call my doctor or nurse today.                 RED ZONE         Need medical care now    Weight gain of 5 pounds overnight.    Chest pain or pressure that does not go away.    Feel less alert.    Wheezing or have trouble breathing when at rest.    Cannot sleep lying down.    Cannot take my water pill.    Pass out or faint. Actions:    I need to call my doctor or nurse now!    Call 911 if I have chest pain or cannot breathe.         oriented to person, place, time and situation

## 2023-08-28 NOTE — PROVIDER CONTACT NOTE (CRITICAL VALUE NOTIFICATION) - NS PROVIDER READ BACK TO LAB
Intubation    Date/Time: 8/28/2023 3:10 PM    Performed by: Glenn Asif CRNA  Authorized by: Glenn Asif CRNA    Intubation:     Induction:  Intravenous    Intubated:  Postinduction    Mask Ventilation:  Easy mask    Attempts:  2    Attempted By:  CRNA    Method of Intubation:  Direct    Blade:  Castro 2    Laryngeal View Grade: Grade I - full view of cords      Laryngeal View Grade comment:  Cords closed    Attempted By (2nd Attempt):  CRNA    Method of Intubation (2nd Attempt):  Bougie (Cords still closed so bougie used)    Blade (2nd Attempt):  Castro 2    Laryngeal View Grade (2nd Attempt): Grade I - full view of cords      Difficult Airway Encountered?: No      Complications:  None    Airway Device:  Oral endotracheal tube    Airway Device Size:  7.5    Style/Cuff Inflation:  Cuffed (inflated to minimal occlusive pressure)    Tube secured:  23    Secured at:  The lips    Placement Verified By:  Capnometry and Colorimetric ETCO2 device    Complicating Factors:  None    Findings Post-Intubation:  BS equal bilateral and atraumatic/condition of teeth unchanged       yes